# Patient Record
Sex: MALE | Race: ASIAN | Employment: OTHER | URBAN - METROPOLITAN AREA
[De-identification: names, ages, dates, MRNs, and addresses within clinical notes are randomized per-mention and may not be internally consistent; named-entity substitution may affect disease eponyms.]

---

## 2017-09-27 ENCOUNTER — HOSPITAL ENCOUNTER (OUTPATIENT)
Dept: RADIATION ONCOLOGY | Age: 77
Discharge: HOME | End: 2017-09-27
Payer: MEDICARE

## 2017-09-27 VITALS
RESPIRATION RATE: 16 BRPM | SYSTOLIC BLOOD PRESSURE: 164 MMHG | DIASTOLIC BLOOD PRESSURE: 89 MMHG | HEART RATE: 71 BPM | WEIGHT: 133.6 LBS | OXYGEN SATURATION: 95 % | TEMPERATURE: 96.8 F

## 2017-09-27 PROCEDURE — 99214 OFFICE O/P EST MOD 30 MIN: CPT | Performed by: RADIOLOGY

## 2017-09-27 RX ORDER — GLIPIZIDE 5 MG/1
5 TABLET ORAL
COMMUNITY

## 2017-09-27 RX ORDER — FENOFIBRATE 54 MG/1
54 TABLET ORAL DAILY
COMMUNITY

## 2017-09-27 RX ORDER — BETAMETHASONE DIPROPIONATE 0.5 MG/G
CREAM TOPICAL 2 TIMES DAILY
COMMUNITY
End: 2018-01-12

## 2017-09-27 RX ORDER — DEXAMETHASONE 4 MG/1
4 TABLET ORAL 4 TIMES DAILY
COMMUNITY
End: 2018-01-12

## 2017-09-27 RX ORDER — CLOBETASOL PROPIONATE 0.5 MG/ML
LOTION TOPICAL CONTINUOUS PRN
COMMUNITY
End: 2018-01-12

## 2017-09-27 RX ORDER — HYDROCHLOROTHIAZIDE 25 MG/1
12.5 TABLET ORAL DAILY
COMMUNITY

## 2017-09-27 RX ORDER — M-VIT,TX,IRON,MINS/CALC/FOLIC 27MG-0.4MG
1 TABLET ORAL DAILY
COMMUNITY
End: 2018-04-13

## 2017-09-27 RX ORDER — FLUCONAZOLE 200 MG/1
200 TABLET ORAL 2 TIMES DAILY
COMMUNITY
End: 2018-01-12

## 2017-09-27 RX ORDER — ISOSORBIDE MONONITRATE 30 MG/1
30 TABLET, EXTENDED RELEASE ORAL DAILY
COMMUNITY

## 2017-09-27 RX ORDER — TERAZOSIN 5 MG/1
10 CAPSULE ORAL NIGHTLY
COMMUNITY
End: 2018-04-13

## 2017-09-27 RX ORDER — METOPROLOL SUCCINATE 50 MG/1
50 TABLET, EXTENDED RELEASE ORAL DAILY
COMMUNITY

## 2017-09-27 RX ORDER — ACYCLOVIR 400 MG/1
400 TABLET ORAL 2 TIMES DAILY
COMMUNITY
End: 2018-01-12

## 2017-09-27 RX ORDER — LOSARTAN POTASSIUM 50 MG/1
25 TABLET ORAL
Status: ON HOLD | COMMUNITY
End: 2018-11-30 | Stop reason: ALTCHOICE

## 2017-09-27 RX ORDER — FUROSEMIDE 20 MG/1
20 TABLET ORAL CONTINUOUS PRN
COMMUNITY
End: 2018-01-12

## 2017-09-27 NOTE — H&P
Jeremiah next Tuesday in f/u. Past Medical History:   Diagnosis Date    CAD (coronary artery disease)     DLBCL (diffuse large B cell lymphoma) (HCC)     stage III from brain biopsy    Hyperlipidemia     Hypertension     Psoriasis     Type 2 diabetes mellitus without complication (HCC)        Past Surgical History:   Procedure Laterality Date    BRAIN BIOPSY      BRAIN SURGERY      CORONARY ARTERY BYPASS GRAFT      EYE SURGERY      cataracts    LYMPH NODE BIOPSY      UPPER GASTROINTESTINAL ENDOSCOPY         Prior to Admission medications    Medication Sig Start Date End Date Taking?  Authorizing Provider   losartan (COZAAR) 50 MG tablet Take 50 mg by mouth daily   Yes Historical Provider, MD   fenofibrate (TRICOR) 54 MG tablet Take 54 mg by mouth daily s [facility] pharmacy: Please dispense generic fenofibrate unless prescriber denote   Yes Historical Provider, MD   terazosin (HYTRIN) 5 MG capsule Take 10 mg by mouth nightly   Yes Historical Provider, MD   hydrochlorothiazide (HYDRODIURIL) 25 MG tablet Take 25 mg by mouth daily   Yes Historical Provider, MD   furosemide (LASIX) 20 MG tablet Take 20 mg by mouth continuous prn   Yes Historical Provider, MD   isosorbide mononitrate (IMDUR) 30 MG extended release tablet Take 30 mg by mouth daily 1 tab in the am half tab PM   Yes Historical Provider, MD   metoprolol succinate (TOPROL XL) 50 MG extended release tablet Take 50 mg by mouth daily One and a half tabs in the AM   Yes Historical Provider, MD   aspirin 81 MG tablet Take 81 mg by mouth daily   Yes Historical Provider, MD   metFORMIN (GLUCOPHAGE) 1000 MG tablet Take 1,000 mg by mouth daily   Yes Historical Provider, MD   glipiZIDE (GLUCOTROL) 5 MG tablet Take 5 mg by mouth 2 times daily (before meals) 5/500 twice daily   Yes Historical Provider, MD   Multiple Vitamins-Minerals (THERAPEUTIC MULTIVITAMIN-MINERALS) tablet Take 1 tablet by mouth daily   Yes Historical Provider, MD   Cholecalciferol (VITAMIN D3) 5000 units TABS Take by mouth   Yes Historical Provider, MD   betamethasone dipropionate (DIPROLENE) 0.05 % cream Apply topically 2 times daily Apply topically 2 times daily. Yes Historical Provider, MD   clobetasol propionate 0.05 % LOTN lotion Apply topically continuous prn   Yes Historical Provider, MD   dexamethasone (DECADRON) 4 MG tablet Take 4 mg by mouth 4 times daily   Yes Historical Provider, MD   acyclovir (ZOVIRAX) 400 MG tablet Take 400 mg by mouth 2 times daily   Yes Historical Provider, MD   fluconazole (DIFLUCAN) 200 MG tablet Take 200 mg by mouth 2 times daily   Yes Historical Provider, MD       No Known Allergies    History   Smoking Status    Former Smoker    Packs/day: 0.50    Years: 20.00    Types: Cigarettes    Quit date: 1990   Smokeless Tobacco    Not on file     History   Alcohol Use No       Family History   Problem Relation Age of Onset    Cancer Maternal Cousin      3 second cousins each with a different cancer, colon,pancrease,and brain       Review Of Systems:   CONSTITUTIONAL: Weakness and fatigue  HEENT:  Dentures  RESPIRATORY:  Negative   CARDIOVASCULAR: No active problems. History of CAD (s/p CABG) and right bundle branch block. GASTROINTESTINAL: Negative  GENITOURINARY: Denies problems, nocturia ×3. However, the patient wears pads, and he leaked urine in the examination room today. MUSCULOSKELETAL: Negative  INTEGUMENT: Psoriasis  HEMATOLOGIC/LYMPHATIC: Prior transfusion  NEUROLOGICAL: Prior to the finding of CNS metastases several months ago, the patient apparently had intact cognition. Now he is somewhat slow, and prior to the biopsy he lost his ability to speak for 1 week, and had memory difficulties. A 10-point review of systems has been conducted and pertinent positives have been   recorded. All other review of systems are negative.      Physical Exam:  Vitals:    09/27/17 1306   BP: (!) 164/89   Pulse: 71   Resp: 16   Temp: 96.8 °F (36 °C)   SpO2: 95%   Weight: 133 lb 9.6 oz (60.6 kg)     ECOG PS: 2  GENERAL: NAD, appears stated age. HEENT: PERRLA, EOMI. Remaining cranial nerves were intact. Oral cavity unremarkable apart from dentures. NECK: No palpable cervical or supraclavicular adenopathy. RESPIRATORY/LUNGS: Good air entry bilaterally, no Rales or wheezing. No crackles. CARDIOVASCULAR/HEART: Regular rate and rhythm. No audible murmur. ABDOMEN/GASTROINTESTINAL: Flat, soft, nontender, nondistended, normal bowel sounds. No organomegaly. EXTREMETIES: No cyanosis, clubbing, or edema. Psoriasis over the lower extremities. NEUROLOGICAL: Slow gait, somewhat unsteady climbing onto the examination table. Right sided pronator drift present, and right  strength is slightly diminished. The patient was unable to sign the consent form due to coordination difficulties, and his wife did this for him. Sensation intact. Assessment/Plan:  77-year-old male with a diagnosis of diffuse large B-cell lymphoma, stage III initially with diffuse adenopathy above and below the diaphragm and a negative bone marrow biopsy. He now has recurrent disease in the brain, and per the most recent scans is free of disease outside the skull. His cognitive function is improving on steroids, and he had a considerable midline shift prior to the biopsy. There could be further change in geometry as the edema resolves, so I delayed the simulation for next week, and will do a repeat MRI for treatment planning at that time. He is not a candidate for methotrexate per Dr. Vanna Call, and I would recommend fractionated radiotherapy. For patients in this elderly age group, there is an increased risk of dementia resulting from radiotherapy, based on published data from [facility] and [facility]. I would recommend standard fractionation of 1.8-2 Gy per fraction, and treat the entire brain to a dose between 30 and 36 Gy, then boost the enhancing lesions to a total of 45-46Gy.   These smaller radiotherapy fractions could potentially reduce the cognitive impact of cranial irradiation. Risks and benefits were discussed with the patient and his wife, and this signed informed consent. CT simulation will take place in 1 week. Thank you for this consult and allowing us to participate in the care of this patient.      Electronically signed by Aggie Ríos MD on 9/27/17 at 2:41 PM

## 2017-10-30 ENCOUNTER — HOSPITAL ENCOUNTER (OUTPATIENT)
Dept: RADIATION ONCOLOGY | Age: 77
Discharge: HOME | End: 2017-10-30
Payer: MEDICARE

## 2017-10-30 DIAGNOSIS — C83.38 DIFFUSE LARGE B-CELL LYMPHOMA OF LYMPH NODES OF MULTIPLE REGIONS (HCC): Primary | ICD-10-CM

## 2017-10-30 PROCEDURE — 77290 THER RAD SIMULAJ FIELD CPLX: CPT | Performed by: RADIOLOGY

## 2017-10-30 PROCEDURE — 77334 RADIATION TREATMENT AID(S): CPT | Performed by: RADIOLOGY

## 2017-10-30 PROCEDURE — 77399 UNLISTED PX MED RADJ PHYSICS: CPT | Performed by: RADIOLOGY

## 2017-10-30 PROCEDURE — 99214 OFFICE O/P EST MOD 30 MIN: CPT | Performed by: RADIOLOGY

## 2017-10-30 NOTE — ONCOLOGY
Former Smoker    Packs/day: 0.50    Years: 20.00    Types: Cigarettes    Quit date: 1990   Smokeless Tobacco    Never Used     History   Alcohol Use No       ALLERGIES:   No Known Allergies     CURRENT MEDICATIONS:     Prior to Admission medications    Medication Sig Start Date End Date Taking? Authorizing Provider   losartan (COZAAR) 50 MG tablet Take 50 mg by mouth daily    Historical Provider, MD   fenofibrate (TRICOR) 54 MG tablet Take 54 mg by mouth daily [facility] pharmacy: Please dispense generic fenofibrate unless prescriber denote    Historical Provider, MD   terazosin (HYTRIN) 5 MG capsule Take 10 mg by mouth nightly    Historical Provider, MD   hydrochlorothiazide (HYDRODIURIL) 25 MG tablet Take 25 mg by mouth daily    Historical Provider, MD   furosemide (LASIX) 20 MG tablet Take 20 mg by mouth continuous prn    Historical Provider, MD   isosorbide mononitrate (IMDUR) 30 MG extended release tablet Take 30 mg by mouth daily 1 tab in the am half tab PM    Historical Provider, MD   metoprolol succinate (TOPROL XL) 50 MG extended release tablet Take 50 mg by mouth daily One and a half tabs in the AM    Historical Provider, MD   aspirin 81 MG tablet Take 81 mg by mouth daily    Historical Provider, MD   metFORMIN (GLUCOPHAGE) 1000 MG tablet Take 1,000 mg by mouth daily    Historical Provider, MD   glipiZIDE (GLUCOTROL) 5 MG tablet Take 5 mg by mouth 2 times daily (before meals) 5/500 twice daily    Historical Provider, MD   Multiple Vitamins-Minerals (THERAPEUTIC MULTIVITAMIN-MINERALS) tablet Take 1 tablet by mouth daily    Historical Provider, MD   Cholecalciferol (VITAMIN D3) 5000 units TABS Take by mouth    Historical Provider, MD   betamethasone dipropionate (DIPROLENE) 0.05 % cream Apply topically 2 times daily Apply topically 2 times daily.     Historical Provider, MD   clobetasol propionate 0.05 % LOTN lotion Apply topically continuous prn    Historical Provider, MD   dexamethasone (DECADRON) 4 MG

## 2017-10-31 PROCEDURE — 77300 RADIATION THERAPY DOSE PLAN: CPT | Performed by: RADIOLOGY

## 2017-10-31 PROCEDURE — 77334 RADIATION TREATMENT AID(S): CPT | Performed by: RADIOLOGY

## 2017-10-31 PROCEDURE — 77412 RADIATION TX DELIVERY LVL 3: CPT | Performed by: RADIOLOGY

## 2017-10-31 PROCEDURE — 77295 3-D RADIOTHERAPY PLAN: CPT | Performed by: RADIOLOGY

## 2017-10-31 PROCEDURE — 77280 THER RAD SIMULAJ FIELD SMPL: CPT | Performed by: RADIOLOGY

## 2017-10-31 PROCEDURE — 99212 OFFICE O/P EST SF 10 MIN: CPT | Performed by: RADIOLOGY

## 2017-11-01 PROCEDURE — 77301 RADIOTHERAPY DOSE PLAN IMRT: CPT | Performed by: RADIOLOGY

## 2017-11-01 PROCEDURE — 77412 RADIATION TX DELIVERY LVL 3: CPT | Performed by: RADIOLOGY

## 2017-11-01 PROCEDURE — 77338 DESIGN MLC DEVICE FOR IMRT: CPT | Performed by: RADIOLOGY

## 2017-11-01 PROCEDURE — 77300 RADIATION THERAPY DOSE PLAN: CPT | Performed by: RADIOLOGY

## 2017-11-02 PROCEDURE — 77370 RADIATION PHYSICS CONSULT: CPT | Performed by: RADIOLOGY

## 2017-11-08 ENCOUNTER — HOSPITAL ENCOUNTER (OUTPATIENT)
Dept: RADIATION ONCOLOGY | Age: 77
Discharge: HOME | End: 2017-11-08
Payer: MEDICARE

## 2017-11-08 PROCEDURE — 77280 THER RAD SIMULAJ FIELD SMPL: CPT | Performed by: RADIOLOGY

## 2017-11-08 PROCEDURE — 77412 RADIATION TX DELIVERY LVL 3: CPT | Performed by: RADIOLOGY

## 2017-11-08 PROCEDURE — 99212 OFFICE O/P EST SF 10 MIN: CPT | Performed by: RADIOLOGY

## 2017-11-08 PROCEDURE — 77386 HC NTSTY MODUL RAD TX DLVR CPLX: CPT | Performed by: RADIOLOGY

## 2017-11-09 PROCEDURE — 77386 HC NTSTY MODUL RAD TX DLVR CPLX: CPT | Performed by: RADIOLOGY

## 2017-11-10 PROCEDURE — 77386 HC NTSTY MODUL RAD TX DLVR CPLX: CPT | Performed by: RADIOLOGY

## 2017-11-10 PROCEDURE — 77336 RADIATION PHYSICS CONSULT: CPT | Performed by: RADIOLOGY

## 2017-11-13 ENCOUNTER — HOSPITAL ENCOUNTER (OUTPATIENT)
Dept: RADIATION ONCOLOGY | Age: 77
Discharge: HOME | End: 2017-11-13
Payer: MEDICARE

## 2017-11-13 PROCEDURE — 77386 HC NTSTY MODUL RAD TX DLVR CPLX: CPT | Performed by: RADIOLOGY

## 2017-11-14 PROCEDURE — 99213 OFFICE O/P EST LOW 20 MIN: CPT | Performed by: RADIOLOGY

## 2017-11-14 PROCEDURE — 77386 HC NTSTY MODUL RAD TX DLVR CPLX: CPT | Performed by: RADIOLOGY

## 2017-11-17 PROCEDURE — 77386 HC NTSTY MODUL RAD TX DLVR CPLX: CPT | Performed by: RADIOLOGY

## 2017-11-17 PROCEDURE — 77336 RADIATION PHYSICS CONSULT: CPT | Performed by: RADIOLOGY

## 2017-11-20 NOTE — ONCOLOGY
November 19, 2017        Heather Fletcher MD  [address] [facility]    Radiation Oncology Completion Letter    Patient Name:  Tima Bravo  Medical Record #: [id number]   Date of Birth: [birth year]  Diagnosis:  Stage III NHL (diffuse large B-cell) dx 5/16, s/p mini-R-CHOP (Dr. Swati Strong). Now  with  CNS  progression  (L  frontal  with satellite, infundibulum, L cerebellum). Treatment Dates: 10/31/17 - 11/17/17    Site: Dose: Total # fractions: Dose per fraction: [employer]: Prescription Depth: Field Technique   Prior Whole Brain at  30.4 Gy 17 1.8 Gy 6 MV photons  Laterals      Whole Brain 5.4 Gy 3 1.8 Gy 6 MV photons 99% Isodose line Laterals   Boost 9 Gy 5 1.8 Gy 6 MV photons 98% Isodose line IMRT/IGRT (VMAT)   Total 45 Gy 25 1.8 Gy        Concurrent therapy:  none. Steroid taper. Technique:   Treatment was started at [facility], due to postop hemorrhage after the biopsy, and PE. A partial course of whole brain RT was delivered before the patient was discharged home. He continued to commute downtown to [facility] for a week before he became aware that he could be treated at [facility]. The whole brain was continued for 3 more fractions, bringing the total to 36Gy at 1.8Gy per fraction. Field within [place] was used to improve dose homogeneity. Fields were defined with an [address]. The boost was delivered with intensity modulation (IMRT), used to optimize the dose distribution and spare normal tissue toxicity, sparing dose to the optics, brainstem, and uninvolved brain. This was delivered with a set of 2 counter rotating VMAT arcs using 6MV photons modulated with an [address]. Daily cone beam CT image guidance (IGRT) was used to allow reduced planning margins reducing potential side effects, aligning to the skull. Treatment course:  Mr. Nubia Rivas tolerated radiation treatment well tolerable symptoms. He had some problems with diabetic control while on decadron, and this was tapered.  Near completion he had headaches without focal neuro deficit, but by completion this had resolved. Plan: f/u 1m with MRI brain. The patient is also following with medical oncology (Dr. Dorina Ovalle). Instructions to taper off of the steroids were given. Thank you again for allowing us to participate in the care of this patient.      Sincerely,       Everton Gutierrez MD, PhD  Board Certified Radiation Oncologist  [facility] affiliated with  [facility]

## 2018-01-12 ENCOUNTER — HOSPITAL ENCOUNTER (OUTPATIENT)
Dept: RADIATION ONCOLOGY | Age: 78
Discharge: HOME | End: 2018-01-12
Payer: MEDICARE

## 2018-01-12 VITALS
RESPIRATION RATE: 14 BRPM | DIASTOLIC BLOOD PRESSURE: 76 MMHG | SYSTOLIC BLOOD PRESSURE: 127 MMHG | OXYGEN SATURATION: 95 % | HEART RATE: 80 BPM | TEMPERATURE: 98.1 F

## 2018-01-12 PROCEDURE — 99214 OFFICE O/P EST MOD 30 MIN: CPT | Performed by: RADIOLOGY

## 2018-01-12 RX ORDER — DABIGATRAN ETEXILATE 75 MG/1
75 CAPSULE, COATED PELLETS ORAL
Status: ON HOLD | COMMUNITY
End: 2018-11-30 | Stop reason: ALTCHOICE

## 2018-01-12 NOTE — ONCOLOGY
PHYSICAL EXAMINATION:  GENERAL: Elderly male in a wheelchair, much more alert and communicative the 1 previously seen. Seen with his wife. The patient was able to ambulate and with assistance climb onto the examination table. HEENT:  PERRLA, EOMI. remaining cranial nerves were intact. Oral cavity WNL. NECK:  No palpable cervical or supraclavicular adenopathy. CHEST/LUNGS: CTA, no rib or spine tenderness. CARDIOVASCULAR:  RRR, no audible murmur  ABDOMEN:  Nontender, nondistended, normal bowel sounds  EXTREMITIES: No C/C/E. there were some rough lesions over the lower extremities with increased pigmentation. These reportedly developed when he was suffering from a pulmonary embolism in the past.  NEUROLOGIC:  Romberg negative, slow unsteady gait secondary to generalized weakness. No pronator drift with normal proprioception and coordination. STUDIES: MRI of the brain, PET/CT, imaging reviewed    IMPRESSION/PLAN:    Overall I think the scans are favorable. It is impossible to rule out persistent disease in the left frontal lobe, at the site of enhancement on MRI. I would recommend close follow-up rather than intervention with radiotherapy or further surgery. The patient appears to be enjoying a good quality of life at present. I will see him back in 3 months with an MRI of the brain. If there is some progression would consider treatment at that time. I advised the patient's wife that if he were to suffer any neurological symptoms or severe headaches prior to that point, we would move the scan up. Electronically signed by Hood Santamaria MD on 1/12/18 at 12:00 PM      Thank you for allowing us to participate in the care of this patient.   cc:   No att. providers found  Dimitri CHAVEZ [address], [place]   [address], [address] [facility]

## 2018-04-13 ENCOUNTER — HOSPITAL ENCOUNTER (OUTPATIENT)
Dept: RADIATION ONCOLOGY | Age: 78
Discharge: HOME | End: 2018-04-13
Payer: MEDICARE

## 2018-04-13 VITALS
OXYGEN SATURATION: 99 % | DIASTOLIC BLOOD PRESSURE: 72 MMHG | SYSTOLIC BLOOD PRESSURE: 113 MMHG | HEART RATE: 70 BPM | WEIGHT: 144 LBS | TEMPERATURE: 97.3 F | RESPIRATION RATE: 16 BRPM

## 2018-04-13 DIAGNOSIS — C83.38 DIFFUSE LARGE B-CELL LYMPHOMA OF LYMPH NODES OF MULTIPLE REGIONS (HCC): Primary | ICD-10-CM

## 2018-04-13 PROCEDURE — 99212 OFFICE O/P EST SF 10 MIN: CPT | Performed by: RADIOLOGY

## 2018-04-13 RX ORDER — GABAPENTIN 300 MG/1
300 CAPSULE ORAL EVERY EVENING
COMMUNITY

## 2018-04-13 RX ORDER — METOPROLOL TARTRATE 50 MG/1
50 TABLET, FILM COATED ORAL EVERY EVENING
Status: ON HOLD | COMMUNITY
End: 2018-11-30

## 2018-11-30 ENCOUNTER — APPOINTMENT (OUTPATIENT)
Dept: ULTRASOUND IMAGING | Age: 78
DRG: 682 | End: 2018-11-30
Payer: MEDICARE

## 2018-11-30 ENCOUNTER — APPOINTMENT (OUTPATIENT)
Dept: GENERAL RADIOLOGY | Age: 78
DRG: 682 | End: 2018-11-30
Payer: MEDICARE

## 2018-11-30 ENCOUNTER — HOSPITAL ENCOUNTER (INPATIENT)
Age: 78
LOS: 6 days | Discharge: NURSING, REHAB OR LONG-TERM CARE | DRG: 682 | End: 2018-12-06
Attending: EMERGENCY MEDICINE | Admitting: INTERNAL MEDICINE
Payer: MEDICARE

## 2018-11-30 ENCOUNTER — APPOINTMENT (OUTPATIENT)
Dept: CT IMAGING | Age: 78
DRG: 682 | End: 2018-11-30
Payer: MEDICARE

## 2018-11-30 DIAGNOSIS — R91.8 LUNG INFILTRATE ON CT: ICD-10-CM

## 2018-11-30 DIAGNOSIS — E86.0 DEHYDRATION: ICD-10-CM

## 2018-11-30 DIAGNOSIS — Z85.79 H/O LYMPHOMA: ICD-10-CM

## 2018-11-30 DIAGNOSIS — R53.1 GENERAL WEAKNESS: Primary | ICD-10-CM

## 2018-11-30 DIAGNOSIS — Z66 DNR (DO NOT RESUSCITATE): ICD-10-CM

## 2018-11-30 PROBLEM — J18.9 PNEUMONIA: Status: ACTIVE | Noted: 2018-11-30

## 2018-11-30 LAB
ALBUMIN SERPL-MCNC: 2.9 G/DL (ref 3.9–4.9)
ALP BLD-CCNC: 334 U/L (ref 35–104)
ALT SERPL-CCNC: 70 U/L (ref 0–41)
ANION GAP SERPL CALCULATED.3IONS-SCNC: 11 MEQ/L (ref 7–13)
AST SERPL-CCNC: 104 U/L (ref 0–40)
BILIRUB SERPL-MCNC: 1.5 MG/DL (ref 0–1.2)
BILIRUBIN URINE: NEGATIVE
BLOOD, URINE: NEGATIVE
BUN BLDV-MCNC: 25 MG/DL (ref 8–23)
CALCIUM SERPL-MCNC: 10.8 MG/DL (ref 8.6–10.2)
CHLORIDE BLD-SCNC: 96 MEQ/L (ref 98–107)
CLARITY: CLEAR
CO2: 27 MEQ/L (ref 22–29)
COLOR: YELLOW
CREAT SERPL-MCNC: 1.31 MG/DL (ref 0.7–1.2)
D DIMER: 2.19 MG/L FEU (ref 0–0.5)
EKG ATRIAL RATE: 74 BPM
EKG ATRIAL RATE: 76 BPM
EKG P AXIS: 48 DEGREES
EKG P AXIS: 9 DEGREES
EKG P-R INTERVAL: 186 MS
EKG P-R INTERVAL: 196 MS
EKG Q-T INTERVAL: 396 MS
EKG Q-T INTERVAL: 402 MS
EKG QRS DURATION: 132 MS
EKG QRS DURATION: 136 MS
EKG QTC CALCULATION (BAZETT): 445 MS
EKG QTC CALCULATION (BAZETT): 446 MS
EKG R AXIS: -53 DEGREES
EKG R AXIS: -58 DEGREES
EKG T AXIS: 12 DEGREES
EKG T AXIS: 32 DEGREES
EKG VENTRICULAR RATE: 74 BPM
EKG VENTRICULAR RATE: 76 BPM
GFR AFRICAN AMERICAN: >60
GFR NON-AFRICAN AMERICAN: 52.6
GLOBULIN: 3.7 G/DL (ref 2.3–3.5)
GLUCOSE BLD-MCNC: 103 MG/DL (ref 74–109)
GLUCOSE URINE: NEGATIVE MG/DL
HCT VFR BLD CALC: 28.2 % (ref 42–52)
HEMOGLOBIN: 9.7 G/DL (ref 14–18)
INR BLD: 2.5
KETONES, URINE: NEGATIVE MG/DL
LACTIC ACID: 2 MMOL/L (ref 0.5–2.2)
LEUKOCYTE ESTERASE, URINE: NEGATIVE
MCH RBC QN AUTO: 32.3 PG (ref 27–31.3)
MCHC RBC AUTO-ENTMCNC: 34.5 % (ref 33–37)
MCV RBC AUTO: 93.7 FL (ref 80–100)
NITRITE, URINE: NEGATIVE
PDW BLD-RTO: 19.5 % (ref 11.5–14.5)
PH UA: 6 (ref 5–9)
PLATELET # BLD: 163 K/UL (ref 130–400)
POTASSIUM SERPL-SCNC: 4.3 MEQ/L (ref 3.5–5.1)
PROTEIN UA: NEGATIVE MG/DL
PROTHROMBIN TIME: 25.4 SEC (ref 9.6–12.3)
RBC # BLD: 3 M/UL (ref 4.7–6.1)
SODIUM BLD-SCNC: 134 MEQ/L (ref 132–144)
SPECIFIC GRAVITY UA: 1.02 (ref 1–1.03)
TOTAL PROTEIN: 6.6 G/DL (ref 6.4–8.1)
TROPONIN: 0.02 NG/ML (ref 0–0.01)
TROPONIN: 0.02 NG/ML (ref 0–0.01)
TROPONIN: 0.03 NG/ML (ref 0–0.01)
URINE REFLEX TO CULTURE: NORMAL
UROBILINOGEN, URINE: 1 E.U./DL
WBC # BLD: 2.4 K/UL (ref 4.8–10.8)

## 2018-11-30 PROCEDURE — 6370000000 HC RX 637 (ALT 250 FOR IP): Performed by: INTERNAL MEDICINE

## 2018-11-30 PROCEDURE — 93005 ELECTROCARDIOGRAM TRACING: CPT

## 2018-11-30 PROCEDURE — 87040 BLOOD CULTURE FOR BACTERIA: CPT

## 2018-11-30 PROCEDURE — 81003 URINALYSIS AUTO W/O SCOPE: CPT

## 2018-11-30 PROCEDURE — 85379 FIBRIN DEGRADATION QUANT: CPT

## 2018-11-30 PROCEDURE — 85610 PROTHROMBIN TIME: CPT

## 2018-11-30 PROCEDURE — 6360000004 HC RX CONTRAST MEDICATION: Performed by: EMERGENCY MEDICINE

## 2018-11-30 PROCEDURE — 76705 ECHO EXAM OF ABDOMEN: CPT

## 2018-11-30 PROCEDURE — 84484 ASSAY OF TROPONIN QUANT: CPT

## 2018-11-30 PROCEDURE — 36415 COLL VENOUS BLD VENIPUNCTURE: CPT

## 2018-11-30 PROCEDURE — 1210000000 HC MED SURG R&B

## 2018-11-30 PROCEDURE — 83605 ASSAY OF LACTIC ACID: CPT

## 2018-11-30 PROCEDURE — 71260 CT THORAX DX C+: CPT

## 2018-11-30 PROCEDURE — 6360000002 HC RX W HCPCS: Performed by: EMERGENCY MEDICINE

## 2018-11-30 PROCEDURE — 85027 COMPLETE CBC AUTOMATED: CPT

## 2018-11-30 PROCEDURE — 71045 X-RAY EXAM CHEST 1 VIEW: CPT

## 2018-11-30 PROCEDURE — 93970 EXTREMITY STUDY: CPT

## 2018-11-30 PROCEDURE — 99285 EMERGENCY DEPT VISIT HI MDM: CPT

## 2018-11-30 PROCEDURE — 2580000003 HC RX 258: Performed by: INTERNAL MEDICINE

## 2018-11-30 PROCEDURE — 2580000003 HC RX 258: Performed by: EMERGENCY MEDICINE

## 2018-11-30 PROCEDURE — 80053 COMPREHEN METABOLIC PANEL: CPT

## 2018-11-30 RX ORDER — SODIUM CHLORIDE 9 MG/ML
INJECTION, SOLUTION INTRAVENOUS CONTINUOUS
Status: DISPENSED | OUTPATIENT
Start: 2018-11-30 | End: 2018-12-01

## 2018-11-30 RX ORDER — WARFARIN SODIUM 1 MG/1
1 TABLET ORAL DAILY
COMMUNITY

## 2018-11-30 RX ORDER — ISOSORBIDE MONONITRATE 30 MG/1
30 TABLET, EXTENDED RELEASE ORAL DAILY
Status: DISCONTINUED | OUTPATIENT
Start: 2018-11-30 | End: 2018-12-06 | Stop reason: HOSPADM

## 2018-11-30 RX ORDER — METOPROLOL SUCCINATE 25 MG/1
25 TABLET, EXTENDED RELEASE ORAL DAILY
Status: DISCONTINUED | OUTPATIENT
Start: 2018-11-30 | End: 2018-12-06 | Stop reason: HOSPADM

## 2018-11-30 RX ORDER — ASPIRIN 81 MG/1
81 TABLET, CHEWABLE ORAL DAILY
Status: DISCONTINUED | OUTPATIENT
Start: 2018-11-30 | End: 2018-12-06 | Stop reason: HOSPADM

## 2018-11-30 RX ORDER — 0.9 % SODIUM CHLORIDE 0.9 %
500 INTRAVENOUS SOLUTION INTRAVENOUS ONCE
Status: COMPLETED | OUTPATIENT
Start: 2018-11-30 | End: 2018-11-30

## 2018-11-30 RX ORDER — WARFARIN SODIUM 1 MG/1
1.5 TABLET ORAL DAILY
COMMUNITY

## 2018-11-30 RX ORDER — SODIUM CHLORIDE 9 MG/ML
INJECTION, SOLUTION INTRAVENOUS CONTINUOUS
Status: DISCONTINUED | OUTPATIENT
Start: 2018-11-30 | End: 2018-11-30

## 2018-11-30 RX ORDER — METOPROLOL SUCCINATE 50 MG/1
50 TABLET, EXTENDED RELEASE ORAL DAILY
COMMUNITY

## 2018-11-30 RX ORDER — LENALIDOMIDE 10 MG/1
10 CAPSULE ORAL DAILY
COMMUNITY

## 2018-11-30 RX ADMIN — SODIUM CHLORIDE 500 ML: 9 INJECTION, SOLUTION INTRAVENOUS at 08:24

## 2018-11-30 RX ADMIN — SODIUM CHLORIDE: 9 INJECTION, SOLUTION INTRAVENOUS at 14:33

## 2018-11-30 RX ADMIN — WARFARIN SODIUM 2.5 MG: 2 TABLET ORAL at 18:23

## 2018-11-30 RX ADMIN — CEFTRIAXONE 1 G: 1 INJECTION, POWDER, FOR SOLUTION INTRAMUSCULAR; INTRAVENOUS at 08:25

## 2018-11-30 RX ADMIN — SODIUM CHLORIDE 500 ML: 9 INJECTION, SOLUTION INTRAVENOUS at 12:39

## 2018-11-30 RX ADMIN — IOPAMIDOL 100 ML: 755 INJECTION, SOLUTION INTRAVENOUS at 06:45

## 2018-11-30 RX ADMIN — AZITHROMYCIN MONOHYDRATE 500 MG: 500 INJECTION, POWDER, LYOPHILIZED, FOR SOLUTION INTRAVENOUS at 08:37

## 2018-11-30 RX ADMIN — ISOSORBIDE MONONITRATE 30 MG: 30 TABLET, EXTENDED RELEASE ORAL at 12:42

## 2018-11-30 RX ADMIN — ASPIRIN 81 MG 81 MG: 81 TABLET ORAL at 12:42

## 2018-11-30 ASSESSMENT — ENCOUNTER SYMPTOMS
BACK PAIN: 0
STRIDOR: 0
CHEST TIGHTNESS: 0
VOMITING: 0
CHOKING: 0
CONSTIPATION: 0
SINUS PRESSURE: 0
BLOOD IN STOOL: 0
EYE PAIN: 0
SHORTNESS OF BREATH: 0
TROUBLE SWALLOWING: 0
VOICE CHANGE: 0
DIARRHEA: 0
EYE REDNESS: 0
WHEEZING: 0
COUGH: 1
EYE DISCHARGE: 0
FACIAL SWELLING: 0
ABDOMINAL PAIN: 0
SORE THROAT: 0

## 2018-11-30 ASSESSMENT — PAIN DESCRIPTION - PROGRESSION: CLINICAL_PROGRESSION: GRADUALLY WORSENING

## 2018-11-30 ASSESSMENT — PAIN SCALES - GENERAL
PAINLEVEL_OUTOF10: 0
PAINLEVEL_OUTOF10: 8
PAINLEVEL_OUTOF10: 0

## 2018-11-30 ASSESSMENT — PAIN DESCRIPTION - LOCATION: LOCATION: GENERALIZED

## 2018-11-30 ASSESSMENT — PAIN DESCRIPTION - PAIN TYPE: TYPE: CHRONIC PAIN

## 2018-11-30 ASSESSMENT — PAIN DESCRIPTION - ONSET: ONSET: ON-GOING

## 2018-11-30 ASSESSMENT — PAIN DESCRIPTION - FREQUENCY: FREQUENCY: INTERMITTENT

## 2018-11-30 ASSESSMENT — PAIN DESCRIPTION - DESCRIPTORS: DESCRIPTORS: ACHING

## 2018-11-30 NOTE — ED PROVIDER NOTES
CHOLECALCIFEROL (VITAMIN D3) 5000 UNITS TABS    Take by mouth    DABIGATRAN (PRADAXA) 75 MG CAPSULE    Take 75 mg by mouth    FENOFIBRATE (TRICOR) 54 MG TABLET    Take 54 mg by mouth daily s [facility]: Please dispense generic fenofibrate unless prescriber denote    GABAPENTIN (NEURONTIN) 300 MG CAPSULE    Take 300 mg by mouth every evening. Lau[facility] GLIPIZIDE (GLUCOTROL) 5 MG TABLET    Take 5 mg by mouth 2 times daily (before meals) 5/500 twice daily    HYDROCHLOROTHIAZIDE (HYDRODIURIL) 25 MG TABLET    Take 12.5 mg by mouth daily     ISOSORBIDE MONONITRATE (IMDUR) 30 MG EXTENDED RELEASE TABLET    Take 30 mg by mouth daily 1 tab in the am half tab PM    LOSARTAN (COZAAR) 50 MG TABLET    Take 25 mg by mouth     METOPROLOL SUCCINATE (TOPROL XL) 50 MG EXTENDED RELEASE TABLET    Take 75 mg by mouth daily In the am    METOPROLOL TARTRATE (LOPRESSOR) 50 MG TABLET    Take 50 mg by mouth every evening       ALLERGIES     Patient has no known allergies.     FAMILY HISTORY       Family History   Problem Relation Age of Onset    Cancer Maternal Cousin         3 second cousins each with a different cancer, colon,pancrease,and brain          SOCIAL HISTORY       Social History     Social History    Marital status:      Spouse name: N/A    Number of children: N/A    Years of education: N/A     Social History Main Topics    Smoking status: Former Smoker     Packs/day: 0.50     Years: 20.00     Types: Cigarettes     Quit date: 1990    Smokeless tobacco: Never Used    Alcohol use No    Drug use: Unknown    Sexual activity: Not Asked     Other Topics Concern    None     Social History Narrative    None       SCREENINGS      @FLOW([id number])@      PHYSICAL EXAM    (up to 7 for level 4, 8 or more for level 5)     ED Triage Vitals [11/30/18 0512]   BP Temp Temp Source Pulse Resp SpO2 Height Weight   (!) 109/57 98.2 °F (36.8 °C) Oral 75 18 96 % -- --       Physical Exam   Constitutional: He is oriented to person, place, and time. He appears well-nourished. Active alert cooperative patient in very much oriented ×3 answering all my questions appropriately eating his breakfast this time   HENT:   Head: Normocephalic and atraumatic. Left Ear: External ear normal.   Nose: Nose normal.   Mouth/Throat: Oropharyngeal exudate present. Eyes: Pupils are equal, round, and reactive to light. EOM are normal. Right eye exhibits no discharge. Left eye exhibits no discharge. Neck: Normal range of motion. No JVD present. No tracheal deviation present. No thyromegaly present. Cardiovascular: Normal rate, regular rhythm and intact distal pulses. Murmur heard. Soft systolic murmur at the apex otherwise essentially normal examination no CHF no hepatosplenomegaly   Pulmonary/Chest: Effort normal. No respiratory distress. He has no wheezes. Abdominal: Soft. He exhibits no distension and no mass. There is no tenderness. There is no guarding. Impression local abdomen patient has no palpable thrill no distention of the abdomen no abdominal tenderness or guarding on my examination   Lymphadenopathy:     He has no cervical adenopathy. Neurological: He is alert and oriented to person, place, and time. No cranial nerve deficit. Coordination normal.   Attention to the cranial examination patient has a good bilateral hand  has no facial passing cranial nerves II through XII grossly intact   Skin: Skin is warm. Nursing note and vitals reviewed.       DIAGNOSTIC RESULTS     EKG: All EKG's are interpreted by the Emergency Department Physician who either signs or Co-signsthis chart in the absence of a cardiologist.      RADIOLOGY:   Non-plain filmimages such as CT, Ultrasound and MRI are read by the radiologist. Plain radiographic images are visualized and preliminarily interpreted by the emergency physician with the below findings:    erpretation per the Radiologist below, if available at the time ofthis note:    XR CHEST PORTABLE   Final MD  11/30/18 5166

## 2018-11-30 NOTE — H&P
screening, patients with immunosuppression, or patients with known primary cancer. * Dimensions are average of long and short axes, rounded to the nearest millimeter. ** Consider all relevant risk factors (see Risk Factors). ____________________________________________________________     Yandy Araujo Chest Portable    Result Date: 11/30/2018  EXAMINATION: XR CHEST PORTABLE CLINICAL HISTORY: Generalized weakness, history of lymphoma COMPARISONS: None available. FINDINGS: Patient is slightly rotated to the right. Cardiac size is borderline. Pulmonary vascularity is normal. There are scattered calcifications from old granulomatous disease. There is left basilar retrocardiac opacity consistent with infiltrate or atelectasis. There is blunting of the left costophrenic angle suggesting small effusion. There is no evidence of adenopathy. There are atherosclerotic changes of the thoracic aorta. There are sternotomy wires and a mediastinal clip. There is spondylosis. LEFT BASILAR ATELECTASIS OR SMALL INFILTRATE. SMALL LEFT PLEURAL EFFUSION. Assessment and Plan         Patient Active Problem List   Diagnosis Code    [facility] C85.10    Coronary atherosclerosis I25.10    Chronic coronary artery disease I25.10    Diabetes mellitus type 2, uncontrolled, without complications ([facility] L54.58    Hypercholesterolemia E78.00    Hypertension I10    Pneumonia J18.9     * Pneumonia. Consolidations seen on the CT of the chest although patient does not exhibit any significant respiratory symptoms. *Generalized weakness, nonfocal, probable progressive decline of his functional status secondary to his neoplastic disease in the setting of acute pneumonia, dehydration and renal failure. *Dehydration and volume loss. *Acute renal failure, secondary to above. Urine analysis is a bland. *Reported lymphoma for which the patient is on CHEMOTHERAPY.     *Elevated troponin, no chest pain, probable type II non-ST

## 2018-11-30 NOTE — PROGRESS NOTES
Nutrition Supplement (ONS) Orders: None  · Anthropometric Measures:  · Ht: 5' 6\" (167.6 cm)   · Current Body Wt: 128 lb 15.5 oz (58.5 kg)  · Admission Body Wt: 128 lb 15.5 oz (58.5 kg)  · Usual Body Wt: 136 lb (61.7 kg) ([facility] chart 9/18.  144#, 4/18 chart)  · % Weight Change:  ,  5%  · Ideal Body Wt: 142 lb (64.4 kg), % Ideal Body 91%  · BMI Classification: BMI 18.5 - 24.9 Normal Weight    Nutrition Interventions:   Continue current diet, Start ONS  Continued Inpatient Monitoring    Nutrition Evaluation:   · Evaluation: Goals set   · Goals: po intake > 75% meals & ONS. Slow weight gain to UBW range.     · Monitoring: Meal Intake, Supplement Intake, Diet Tolerance, Constipation, Weight, Pertinent Labs      Electronically signed by Mara Kim RD, LD on 11/30/18 at 1:43 PM

## 2018-11-30 NOTE — ED NOTES
Pt incontinent upon arrival to ED, hygiene provided, linens changed and clean gown provided. [place] catheter applied to penis and secured to leg with leg securement device. Pt tolerated well, warm blankets applied and pt positioned for comfort.       Shanell AmayaGeising[facility]own Hospital  11/30/18 3412

## 2018-11-30 NOTE — ED NOTES
Patient returned from radiology via cart. Assumed care of patient after receiving patient report.       Suzy Varela RN  11/30/18 2345

## 2018-11-30 NOTE — ED PROVIDER NOTES
name: N/A    Number of children: N/A    Years of education: N/A     Social History Main Topics    Smoking status: Former Smoker     Packs/day: 0.50     Years: 20.00     Types: Cigarettes     Quit date: 1990    Smokeless tobacco: Never Used    Alcohol use No    Drug use: Unknown    Sexual activity: Not Asked     Other Topics Concern    None     Social History Narrative    None       SCREENINGS    Chris Coma Scale  Eye Opening: Spontaneous  Best Verbal Response: Oriented  Best Motor Response: Obeys commands  [place] Coma Scale Score: 15        PHYSICAL EXAM    (up to 7 for level 4, 8 or more for level 5)     ED Triage Vitals [11/30/18 0512]   BP Temp Temp Source Pulse Resp SpO2 Height Weight   (!) 109/57 98.2 °F (36.8 °C) Oral 75 18 96 % -- --       General Appearance:  Alert, Cachectic    Head:  Normocephalic, without obvious abnormality, atraumatic. Eyes:  conjunctiva/corneas clear, EOM's intact. Sclera anicteric. ENT: Mucous membranes moist.   Neck: Supple, symmetrical, trachea midline, no adenopathy. No jugular venous distention. Lungs:   No Respiratory Distress. Chest Wall:  Nontender, healed old sternotomy scar    Heart:  +S1/S2, No murmurs or gallops . Abdomen:   Nontender/Nondistended No hepatosplenomegaly   Extremities:  Full range of motion. Tenderness palpation bilaterally. Pulses: Peripheral pulses 2+ and equal in all extremities. Skin:  No rashes or lesions to exposed skin. Neurologic: Alert and oriented X 3. Motor grossly normal.  Speech clear.         DIAGNOSTIC RESULTS     EKG: All EKG's are interpreted by the Emergency Department Physician who either signs or Co-signsthis chart in the absence of a cardiologist.    EKG Interpretation    Interpreted by emergency department physician    Rhythm: normal sinus   Rate: normal  Axis: normal  Ectopy: none  Conduction: bifasicular  ST Segments: normal  T Waves: non specific changes      Clinical Impression: non-specific EKG    [facility]      RADIOLOGY:   Non-plain filmimages such as CT, Ultrasound and MRI are read by the radiologist. Plain radiographic images are visualized and preliminarily interpreted by the emergency physician with the below findings:      Interpretation per the Radiologist below, if available at the time of this note:    RADIOLOGY REPORT   Final Result      XR CHEST PORTABLE   Final Result   RESOLVING INFILTRATE/ATELECTASIS LEFT BASE. NO DEVELOPING PROCESS IN THE LUNG YODER. NM HEPATOBILIARY SCAN W EJECTION FRACTION   Final Result   NORMAL RADIONUCLIDE HEPATOBILIARY SCAN (HIDA) WITH A NORMAL GALLBLADDER EJECTION FRACTION. CT HEAD WO CONTRAST   Final Result      NO ACUTE INTRA-AXIAL OR EXTRA-AXIAL FINDINGS. IF SIGNS OR SYMPTOMS PERSIST THEN CONSIDER REPEAT CT SCAN IN 12 TO 24 HOURS OR MRI TO FURTHER EVALUATE IF THERE ARE NO CONTRAINDICATIONS            All CT scans at this facility use dose modulation, iterative reconstruction, and/or weight based dosing when appropriate to reduce radiation dose to as low as reasonably achievable. US ABDOMEN LIMITED   Final Result      THERE IS TRACE PERICHOLECYSTIC FLUID. THE GALLBLADDER WALL IS THICKENED. THICKENING OF THE GALLBLADDER WALL IS A NONSPECIFIC FINDING CAN BE SEEN IN MULTIPLE ETIOLOGIES. CORRELATE CLINICALLY. CORRELATE CLINICALLY. 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 NO EVIDENCE OF ACUTE DEEP VENOUS THROMBOSIS. CT CHEST W CONTRAST   Final Result   VERY LIMITED EXAMINATION FOR EVALUATION OF PULMONARY EMBOLISM. NO DEFINITE CENTRAL OR PROXIMAL PULMONARY EMBOLISM IDENTIFIED.  IF THERE REMAINS AN INTERMEDIATE OR HIGH CLINICAL SUSPICION OF PULMONARY EMBOLISM, REPEAT CHEST CTA OR RADIONUCLIDE    LUNG SCAN IS ADVISED. BIBASILAR SUBPLEURAL SUBSEGMENTAL CONSOLIDATIONS, LEFT GREATER THAN RIGHT, CONSISTENT WITH PNEUMONIA. SMALL LEFT PARAPNEUMONIC EFFUSION. RIGHT LUNG APICAL 4 MM NODULE AND RIGHT LUNG MIDDLE LOBE 8 MM NODULE--THIS PATIENT LIKELY HAS    PRIOR CHEST IMAGING STUDIES PERFORMED AT OTHER INSTITUTIONS--RETRIEVAL OF PRIOR STUDIES OR REPORTS FOR COMPARISON WOULD BE HELPFUL. [facility] [facility] GUIDELINES ARE PROVIDED BELOW FOR REFERENCE, BUT ARE NOT APPLICABLE FOR PATIENTS WITH KNOWN PRIMARY    CANCER. ABDOMINAL ADENOPATHY, CONSISTENT WITH HISTORY OF LYMPHOMA.         ____________________________________________________________      Guidelines for Management of Incidental Pulmonary Nodules Detected on CT Images: From the Fleischner Society 2017    Radiology:2017 Jul;284(1):228-243. doi: 10.1148/radiol. [id number]. Epub 2017 Feb 23. A: Solid Nodules*        Multiple                              Low risk**                   <6 mm     No routine follow-up                  6-8 mm     CT at 3-6 months, then consider CT at 18-24 months                   >8 mm     CT at 3-6 months, then  consider CT at 18-24 months   Use most suspicious nodule as guide to management. Follow-up intervals may vary according to size and risk (recommendation 2A). High risk**                   <6 mm     Optional CT at 12 months                  6-8 mm     CT at 3-6 months, then at 18-24 months                  >8 mm     CT at 3-6 months, then at 18-24 months   Use most suspicious nodule as guide to management. Follow-up intervals may vary according to size and risk (recommendation 2A). Note. --These recommendations do not apply to lung cancer screening, patients with immunosuppression, or patients with known primary cancer. * Dimensions are average of long and short axes, rounded to the nearest millimeter.     ** Consider all relevant risk factors (see Risk TROPONIN - Abnormal; Notable for the following:     Troponin 0.025 (*)     All other components within normal limits    Narrative:     CALL  [employer] tel. [phone],  critical trop results called to and read back by [facility], 11/30/2018 18:05, by  ALEJANDRA   PROTIME-INR - Abnormal; Notable for the following:     Protime 25.4 (*)     All other components within normal limits   COMPREHENSIVE METABOLIC PANEL - Abnormal; Notable for the following:     Glucose 123 (*)     Total Protein 6.0 (*)     Alb 2.4 (*)     Alkaline Phosphatase 377 (*)     ALT 68 (*)     AST 95 (*)     Globulin 3.6 (*)     All other components within normal limits   CBC WITH AUTO DIFFERENTIAL - Abnormal; Notable for the following:     WBC 2.0 (*)     RBC 2.86 (*)     Hemoglobin 9.2 (*)     Hematocrit 26.7 (*)     MCH 32.3 (*)     RDW 19.3 (*)     Neutrophils # 1.1 (*)     Lymphocytes # 0.6 (*)     All other components within normal limits   PROTIME-INR - Abnormal; Notable for the following:     Protime 36.1 (*)     All other components within normal limits   PROTIME-INR - Abnormal; Notable for the following:     Protime 59.2 (*)     INR 5.8 (*)     All other components within normal limits    Narrative:     CALL  [employer] tel. [phone],  Coag results called to and read back by saida, 12/02/2018 08:35, by Tristan Vallejo   COMPREHENSIVE METABOLIC PANEL - Abnormal; Notable for the following:     CO2 18 (*)     Glucose 125 (*)     Total Protein 5.9 (*)     Alb 2.2 (*)     Total Bilirubin 1.5 (*)     Alkaline Phosphatase 509 (*)     ALT 63 (*)     AST 91 (*)     Globulin 3.7 (*)     All other components within normal limits   CBC WITH AUTO DIFFERENTIAL - Abnormal; Notable for the following:     WBC 2.0 (*)     RBC 2.84 (*)     Hemoglobin 8.8 (*)     Hematocrit 26.9 (*)     MCHC 32.7 (*)     RDW 19.5 (*)     Neutrophils # 1.1 (*)     Lymphocytes # 0.6 (*)     All other components within normal limits   PROTIME-INR - Abnormal; Notable for the following: Protime 44.1 (*)     All other components within normal limits   COMPREHENSIVE METABOLIC PANEL - Abnormal; Notable for the following:     Glucose 141 (*)     Total Protein 5.8 (*)     Alb 2.5 (*)     Total Bilirubin 1.6 (*)     Alkaline Phosphatase 606 (*)     ALT 71 (*)     AST 82 (*)     All other components within normal limits   CBC WITH AUTO DIFFERENTIAL - Abnormal; Notable for the following:     WBC 2.1 (*)     RBC 2.76 (*)     Hemoglobin 8.8 (*)     Hematocrit 25.5 (*)     MCH 31.9 (*)     RDW 20.2 (*)     Neutrophils # 1.2 (*)     Lymphocytes # 0.6 (*)     All other components within normal limits   PROTIME-INR - Abnormal; Notable for the following:     Protime 19.9 (*)     All other components within normal limits   COMPREHENSIVE METABOLIC PANEL - Abnormal; Notable for the following:     Glucose 151 (*)     CREATININE 1.21 (*)     GFR Non- 57.7 (*)     Total Protein 5.9 (*)     Alb 2.5 (*)     Total Bilirubin 1.6 (*)     Alkaline Phosphatase 731 (*)     ALT 71 (*)     AST 81 (*)     All other components within normal limits   CBC WITH AUTO DIFFERENTIAL - Abnormal; Notable for the following:     RBC 2.84 (*)     Hemoglobin 9.0 (*)     Hematocrit 26.4 (*)     MCH 31.6 (*)     RDW 19.4 (*)     All other components within normal limits   HEMOGLOBIN A1C - Abnormal; Notable for the following:     Hemoglobin A1C 6.8 (*)     All other components within normal limits   PROTIME-INR - Abnormal; Notable for the following:     Protime 15.1 (*)     All other components within normal limits   COMPREHENSIVE METABOLIC PANEL - Abnormal; Notable for the following:     Glucose 149 (*)     Total Protein 5.8 (*)     Alb 2.6 (*)     Total Bilirubin 1.9 (*)     Alkaline Phosphatase 737 (*)     ALT 66 (*)     AST 71 (*)     All other components within normal limits   CBC WITH AUTO DIFFERENTIAL - Abnormal; Notable for the following:     WBC 2.4 (*)     RBC 2.81 (*)     Hemoglobin 8.9 (*)     Hematocrit 26.1 (*) weakness    2. Lung infiltrate on CT    3. Dehydration    4. H/O lymphoma    5.  DNR (do not resuscitate)        DISPOSITION/PLAN   DISPOSITION Admitted 11/30/2018 08:47:06 AM      PATIENT REFERRED TO:  Marcia Washington  [address] [place] [address]  [phone]            DISCHARGE MEDICATIONS:  Discharge Medication List as of 12/6/2018  3:08 PM             (Please note that portions of this note were completed with a voice recognitionprogram.  Efforts were made to edit the dictations but occasionally words are mis-transcribed.)    Martha Farmer DO (electronically signed)  Attending Emergency Physician          Matrha Farmer DO  12/23/18 7401

## 2018-12-01 ENCOUNTER — APPOINTMENT (OUTPATIENT)
Dept: CT IMAGING | Age: 78
DRG: 682 | End: 2018-12-01
Payer: MEDICARE

## 2018-12-01 LAB
ALBUMIN SERPL-MCNC: 2.4 G/DL (ref 3.9–4.9)
ALP BLD-CCNC: 377 U/L (ref 35–104)
ALT SERPL-CCNC: 68 U/L (ref 0–41)
ANION GAP SERPL CALCULATED.3IONS-SCNC: 9 MEQ/L (ref 7–13)
ANISOCYTOSIS: PRESENT
AST SERPL-CCNC: 95 U/L (ref 0–40)
BASOPHILS ABSOLUTE: 0 K/UL (ref 0–0.2)
BASOPHILS RELATIVE PERCENT: 0.2 %
BILIRUB SERPL-MCNC: 1.2 MG/DL (ref 0–1.2)
BUN BLDV-MCNC: 20 MG/DL (ref 8–23)
CALCIUM SERPL-MCNC: 9.5 MG/DL (ref 8.6–10.2)
CHLORIDE BLD-SCNC: 103 MEQ/L (ref 98–107)
CO2: 23 MEQ/L (ref 22–29)
CREAT SERPL-MCNC: 1 MG/DL (ref 0.7–1.2)
EOSINOPHILS ABSOLUTE: 0 K/UL (ref 0–0.7)
EOSINOPHILS RELATIVE PERCENT: 1.2 %
GFR AFRICAN AMERICAN: >60
GFR NON-AFRICAN AMERICAN: >60
GLOBULIN: 3.6 G/DL (ref 2.3–3.5)
GLUCOSE BLD-MCNC: 123 MG/DL (ref 74–109)
HCT VFR BLD CALC: 26.7 % (ref 42–52)
HEMOGLOBIN: 9.2 G/DL (ref 14–18)
INR BLD: 3.5
LYMPHOCYTES ABSOLUTE: 0.6 K/UL (ref 1–4.8)
LYMPHOCYTES RELATIVE PERCENT: 30.6 %
MCH RBC QN AUTO: 32.3 PG (ref 27–31.3)
MCHC RBC AUTO-ENTMCNC: 34.6 % (ref 33–37)
MCV RBC AUTO: 93.3 FL (ref 80–100)
MONOCYTES ABSOLUTE: 0.3 K/UL (ref 0.2–0.8)
MONOCYTES RELATIVE PERCENT: 13.1 %
NEUTROPHILS ABSOLUTE: 1.1 K/UL (ref 1.4–6.5)
NEUTROPHILS RELATIVE PERCENT: 54.9 %
PDW BLD-RTO: 19.3 % (ref 11.5–14.5)
PLATELET # BLD: 143 K/UL (ref 130–400)
POTASSIUM SERPL-SCNC: 4.4 MEQ/L (ref 3.5–5.1)
PROTHROMBIN TIME: 36.1 SEC (ref 9.6–12.3)
RBC # BLD: 2.86 M/UL (ref 4.7–6.1)
SODIUM BLD-SCNC: 135 MEQ/L (ref 132–144)
TOTAL PROTEIN: 6 G/DL (ref 6.4–8.1)
WBC # BLD: 2 K/UL (ref 4.8–10.8)

## 2018-12-01 PROCEDURE — 97535 SELF CARE MNGMENT TRAINING: CPT

## 2018-12-01 PROCEDURE — 6360000002 HC RX W HCPCS: Performed by: INTERNAL MEDICINE

## 2018-12-01 PROCEDURE — 36415 COLL VENOUS BLD VENIPUNCTURE: CPT

## 2018-12-01 PROCEDURE — 93005 ELECTROCARDIOGRAM TRACING: CPT

## 2018-12-01 PROCEDURE — 70450 CT HEAD/BRAIN W/O DYE: CPT

## 2018-12-01 PROCEDURE — 97530 THERAPEUTIC ACTIVITIES: CPT

## 2018-12-01 PROCEDURE — 97162 PT EVAL MOD COMPLEX 30 MIN: CPT

## 2018-12-01 PROCEDURE — 6370000000 HC RX 637 (ALT 250 FOR IP): Performed by: INTERNAL MEDICINE

## 2018-12-01 PROCEDURE — 2580000003 HC RX 258: Performed by: INTERNAL MEDICINE

## 2018-12-01 PROCEDURE — 97166 OT EVAL MOD COMPLEX 45 MIN: CPT

## 2018-12-01 PROCEDURE — 1210000000 HC MED SURG R&B

## 2018-12-01 PROCEDURE — G8979 MOBILITY GOAL STATUS: HCPCS

## 2018-12-01 PROCEDURE — 85610 PROTHROMBIN TIME: CPT

## 2018-12-01 PROCEDURE — 80053 COMPREHEN METABOLIC PANEL: CPT

## 2018-12-01 PROCEDURE — 85025 COMPLETE CBC W/AUTO DIFF WBC: CPT

## 2018-12-01 PROCEDURE — 97116 GAIT TRAINING THERAPY: CPT

## 2018-12-01 PROCEDURE — G8978 MOBILITY CURRENT STATUS: HCPCS

## 2018-12-01 RX ORDER — SODIUM CHLORIDE 9 MG/ML
INJECTION, SOLUTION INTRAVENOUS CONTINUOUS
Status: DISCONTINUED | OUTPATIENT
Start: 2018-12-01 | End: 2018-12-02

## 2018-12-01 RX ORDER — INSULIN GLARGINE 100 [IU]/ML
10 INJECTION, SOLUTION SUBCUTANEOUS NIGHTLY
COMMUNITY

## 2018-12-01 RX ADMIN — CEFTRIAXONE 1 G: 1 INJECTION, POWDER, FOR SOLUTION INTRAMUSCULAR; INTRAVENOUS at 09:15

## 2018-12-01 RX ADMIN — ASPIRIN 81 MG 81 MG: 81 TABLET ORAL at 09:16

## 2018-12-01 RX ADMIN — PIPERACILLIN SODIUM AND TAZOBACTAM SODIUM 3.38 G: 3; .375 INJECTION, POWDER, LYOPHILIZED, FOR SOLUTION INTRAVENOUS at 20:27

## 2018-12-01 RX ADMIN — METOPROLOL SUCCINATE 25 MG: 25 TABLET, FILM COATED, EXTENDED RELEASE ORAL at 09:16

## 2018-12-01 RX ADMIN — SODIUM CHLORIDE: 9 INJECTION, SOLUTION INTRAVENOUS at 02:13

## 2018-12-01 RX ADMIN — AZITHROMYCIN MONOHYDRATE 500 MG: 500 INJECTION, POWDER, LYOPHILIZED, FOR SOLUTION INTRAVENOUS at 09:16

## 2018-12-01 RX ADMIN — ISOSORBIDE MONONITRATE 30 MG: 30 TABLET, EXTENDED RELEASE ORAL at 09:16

## 2018-12-01 RX ADMIN — SODIUM CHLORIDE: 9 INJECTION, SOLUTION INTRAVENOUS at 13:00

## 2018-12-01 ASSESSMENT — PAIN SCALES - GENERAL
PAINLEVEL_OUTOF10: 0

## 2018-12-01 NOTE — PROGRESS NOTES
Occupational Therapy  Medical Daily Treatment Note  NAME: Laci Moore  : 1940  MRN: [id number]    Date of Service: 2018    Patient Diagnosis(es): The primary encounter diagnosis was General weakness. Diagnoses of Lung infiltrate on CT, Dehydration, H/O lymphoma, and DNR (do not resuscitate) were also pertinent to this visit. 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 has a past surgical history that includes Coronary artery bypass graft; eye surgery; Upper gastrointestinal endoscopy; Brain Biopsy; brain surgery; and lymph node biopsy. Restrictions     Subjective   General  Chart Reviewed: Yes  Patient assessed for rehabilitation services?: Yes  Pain Assessment  Patient Currently in Pain: No  Pain Level: 0  Vital Signs  Patient Currently in Pain: No   Orientation  Orientation  Overall Orientation Status: Impaired  Objective    ADL  Feeding: Moderate assistance  Grooming: Minimal assistance  UE Bathing: Maximum assistance  LE Bathing: Dependent/Total  UE Dressing: Moderate assistance;Maximum assistance  LE Dressing: Dependent/Total  Toileting: Maximum assistance        Balance  Sitting Balance: Supervision  Standing Balance: Minimal assistance  Standing Balance  Sit to stand: Moderate assistance  Stand to sit: Moderate assistance  Functional Mobility  Functional - Mobility Device: Rolling Walker  Activity: Other  Assist Level: Minimal assistance  Toilet Transfers  Toilet Transfer: Unable to assess  Tub Transfers  Tub Transfers: Not tested     Transfers  Stand Step Transfers: Minimal assistance  Sit to stand:  Moderate assistance  Stand to sit: Moderate assistance                       Cognition  Overall

## 2018-12-01 NOTE — PROGRESS NOTES
Pt has been reoriented to time day and surroundings, pt has no c/o pain this am, pt repositioned for comfort, pt on RA, no distress noted, condom cath noted in place, pt to have CT of head per MD orders, wife's questions have been addressed this am, pt Warfarin held this am per MD orders, pt INR 3.5, pt safety maintained pt call light has been placed within reach.

## 2018-12-01 NOTE — PROGRESS NOTES
Pt has been taken to CT and is back in room pt tolerated ambulation well with assistance x 2 and gait belt utilized, pt wife remains at bedside, pt safety maintained, pt has been reoriented to call light, call light has been placed within reach.

## 2018-12-01 NOTE — PROGRESS NOTES
Physical Therapy    Facility/Department: [facility] MED SURG UNIT  Initial Assessment -MS pace  NAME: Concetta Alvarenga  : 1940  MRN: [id number]    Date of Service: 2018    Discharge Recommendations:  [address]        Patient Diagnosis(es): The primary encounter diagnosis was General weakness. Diagnoses of Lung infiltrate on CT, Dehydration, H/O lymphoma, and DNR (do not resuscitate) were also pertinent to this visit. 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 has a past surgical history that includes Coronary artery bypass graft; eye surgery; Upper gastrointestinal endoscopy; Brain Biopsy; brain surgery; and lymph node biopsy.     Restrictions     Vision/Hearing        Subjective  General  Chart Reviewed: Yes  Patient assessed for rehabilitation services?: Yes  Family / Caregiver Present: Yes (wife Rosanne Mathews)  Referring Practitioner: Dl Polanco  Referral Date : 18  Diagnosis: Pneumonia  Subjective  Subjective: Pt reports not having any pain currently  Pain Screening  Patient Currently in Pain: No  Pain Assessment  Pain Level: 0  Vital Signs  Patient Currently in Pain: No       Orientation     Social/Functional History  Social/Functional History  Lives With: Spouse  Type of Home: House  Home Layout: Two level  Home Access: Stairs to enter with rails  Entrance Stairs - Number of Steps: 7 steps - railing on right  Entrance Stairs - Rails: Right  Bathroom Shower/Tub: Tub/Shower unit, Walk-in shower  Bathroom Toilet: Standard  Bathroom Equipment: Tub transfer bench (no grab bars in bathroom)  Bathroom Accessibility: Walker accessible  Home Equipment: Rolling walker, [employer] Help From: Family

## 2018-12-01 NOTE — PROGRESS NOTES
MD verbally spoke with RN in am regarding pt IVF rate change, rate change was initiated in am, after order review no order noted, charge nurse perfect serv MD and obtained and clarified official order.

## 2018-12-02 LAB
ALBUMIN SERPL-MCNC: 2.2 G/DL (ref 3.9–4.9)
ALP BLD-CCNC: 509 U/L (ref 35–104)
ALT SERPL-CCNC: 63 U/L (ref 0–41)
ANION GAP SERPL CALCULATED.3IONS-SCNC: 13 MEQ/L (ref 7–13)
ANISOCYTOSIS: PRESENT
AST SERPL-CCNC: 91 U/L (ref 0–40)
BASOPHILS ABSOLUTE: 0 K/UL (ref 0–0.2)
BASOPHILS RELATIVE PERCENT: 0.3 %
BILIRUB SERPL-MCNC: 1.5 MG/DL (ref 0–1.2)
BUN BLDV-MCNC: 16 MG/DL (ref 8–23)
CALCIUM SERPL-MCNC: 9.3 MG/DL (ref 8.6–10.2)
CHLORIDE BLD-SCNC: 103 MEQ/L (ref 98–107)
CO2: 18 MEQ/L (ref 22–29)
CREAT SERPL-MCNC: 0.9 MG/DL (ref 0.7–1.2)
EOSINOPHILS ABSOLUTE: 0 K/UL (ref 0–0.7)
EOSINOPHILS RELATIVE PERCENT: 0.8 %
GFR AFRICAN AMERICAN: >60
GFR NON-AFRICAN AMERICAN: >60
GLOBULIN: 3.7 G/DL (ref 2.3–3.5)
GLUCOSE BLD-MCNC: 125 MG/DL (ref 74–109)
HAV IGM SER IA-ACNC: NORMAL
HCT VFR BLD CALC: 26.9 % (ref 42–52)
HEMOGLOBIN: 8.8 G/DL (ref 14–18)
HEPATITIS B CORE IGM ANTIBODY: NORMAL
HEPATITIS B SURFACE ANTIGEN INTERPRETATION: NORMAL
HEPATITIS C ANTIBODY INTERPRETATION: NORMAL
HEPATITIS INTERPRETATION:: NORMAL
INR BLD: 5.8
LYMPHOCYTES ABSOLUTE: 0.6 K/UL (ref 1–4.8)
LYMPHOCYTES RELATIVE PERCENT: 28.4 %
MCH RBC QN AUTO: 31 PG (ref 27–31.3)
MCHC RBC AUTO-ENTMCNC: 32.7 % (ref 33–37)
MCV RBC AUTO: 94.8 FL (ref 80–100)
MONOCYTES ABSOLUTE: 0.3 K/UL (ref 0.2–0.8)
MONOCYTES RELATIVE PERCENT: 14.6 %
NEUTROPHILS ABSOLUTE: 1.1 K/UL (ref 1.4–6.5)
NEUTROPHILS RELATIVE PERCENT: 55.9 %
PDW BLD-RTO: 19.5 % (ref 11.5–14.5)
PLATELET # BLD: 144 K/UL (ref 130–400)
POTASSIUM SERPL-SCNC: 4.1 MEQ/L (ref 3.5–5.1)
PROTHROMBIN TIME: 59.2 SEC (ref 9.6–12.3)
RBC # BLD: 2.84 M/UL (ref 4.7–6.1)
SODIUM BLD-SCNC: 134 MEQ/L (ref 132–144)
TOTAL PROTEIN: 5.9 G/DL (ref 6.4–8.1)
WBC # BLD: 2 K/UL (ref 4.8–10.8)

## 2018-12-02 PROCEDURE — 97530 THERAPEUTIC ACTIVITIES: CPT

## 2018-12-02 PROCEDURE — 85025 COMPLETE CBC W/AUTO DIFF WBC: CPT

## 2018-12-02 PROCEDURE — 2580000003 HC RX 258: Performed by: INTERNAL MEDICINE

## 2018-12-02 PROCEDURE — 97110 THERAPEUTIC EXERCISES: CPT

## 2018-12-02 PROCEDURE — 80074 ACUTE HEPATITIS PANEL: CPT

## 2018-12-02 PROCEDURE — 85610 PROTHROMBIN TIME: CPT

## 2018-12-02 PROCEDURE — 6360000002 HC RX W HCPCS: Performed by: INTERNAL MEDICINE

## 2018-12-02 PROCEDURE — 6370000000 HC RX 637 (ALT 250 FOR IP): Performed by: INTERNAL MEDICINE

## 2018-12-02 PROCEDURE — 80053 COMPREHEN METABOLIC PANEL: CPT

## 2018-12-02 PROCEDURE — 36415 COLL VENOUS BLD VENIPUNCTURE: CPT

## 2018-12-02 PROCEDURE — 1210000000 HC MED SURG R&B

## 2018-12-02 RX ORDER — PHYTONADIONE 10 MG/ML
2 INJECTION, EMULSION INTRAMUSCULAR; INTRAVENOUS; SUBCUTANEOUS ONCE
Status: COMPLETED | OUTPATIENT
Start: 2018-12-02 | End: 2018-12-02

## 2018-12-02 RX ADMIN — METOPROLOL SUCCINATE 25 MG: 25 TABLET, FILM COATED, EXTENDED RELEASE ORAL at 08:15

## 2018-12-02 RX ADMIN — PIPERACILLIN SODIUM AND TAZOBACTAM SODIUM 3.38 G: 3; .375 INJECTION, POWDER, LYOPHILIZED, FOR SOLUTION INTRAVENOUS at 02:05

## 2018-12-02 RX ADMIN — ISOSORBIDE MONONITRATE 30 MG: 30 TABLET, EXTENDED RELEASE ORAL at 08:16

## 2018-12-02 RX ADMIN — ASPIRIN 81 MG 81 MG: 81 TABLET ORAL at 08:15

## 2018-12-02 RX ADMIN — AZITHROMYCIN MONOHYDRATE 500 MG: 500 INJECTION, POWDER, LYOPHILIZED, FOR SOLUTION INTRAVENOUS at 08:15

## 2018-12-02 RX ADMIN — PIPERACILLIN SODIUM AND TAZOBACTAM SODIUM 3.38 G: 3; .375 INJECTION, POWDER, LYOPHILIZED, FOR SOLUTION INTRAVENOUS at 17:12

## 2018-12-02 RX ADMIN — PIPERACILLIN SODIUM AND TAZOBACTAM SODIUM 3.38 G: 3; .375 INJECTION, POWDER, LYOPHILIZED, FOR SOLUTION INTRAVENOUS at 10:24

## 2018-12-02 RX ADMIN — PHYTONADIONE 2 MG: 10 INJECTION, EMULSION INTRAMUSCULAR; INTRAVENOUS; SUBCUTANEOUS at 10:37

## 2018-12-02 ASSESSMENT — PAIN SCALES - WONG BAKER
WONGBAKER_NUMERICALRESPONSE: 0

## 2018-12-02 ASSESSMENT — PAIN SCALES - GENERAL
PAINLEVEL_OUTOF10: 0

## 2018-12-02 NOTE — PLAN OF CARE
Problem: Risk for Impaired Skin Integrity  Goal: Tissue integrity - skin and mucous membranes  Structural intactness and normal physiological function of skin and  mucous membranes. Outcome: Ongoing      Problem: Falls - Risk of:  Goal: Will remain free from falls  Will remain free from falls   Outcome: Ongoing    Goal: Absence of physical injury  Absence of physical injury   Outcome: Ongoing      Problem: DAILY CARE  Goal: Daily care needs are met  Outcome: Ongoing      Problem: PAIN  Goal: Patient's pain/discomfort is manageable  Outcome: Ongoing      Problem: KNOWLEDGE DEFICIT  Goal: Patient/S.O. demonstrates understanding of disease process, treatment plan, medications, and discharge instructions.   Outcome: Ongoing      Problem: DISCHARGE BARRIERS  Goal: Patient's continuum of care needs are met  Outcome: Ongoing

## 2018-12-02 NOTE — PROGRESS NOTES
Physical Therapy  Facility/Department: [facility] MED SURG UNIT  Daily Treatment Note  NAME: Lola Church  : 1940  MRN: [id number]    Date of Service: 2018    Discharge Recommendations:  [address]        Patient Diagnosis(es): The primary encounter diagnosis was General weakness. Diagnoses of Lung infiltrate on CT, Dehydration, H/O lymphoma, and DNR (do not resuscitate) were also pertinent to this visit. 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 has a past surgical history that includes Coronary artery bypass graft; eye surgery; Upper gastrointestinal endoscopy; Brain Biopsy; brain surgery; and lymph node biopsy. Restrictions     Subjective   General  Chart Reviewed: Yes  Family / Caregiver Present: Yes (wife Nadeem Paniagua)  Referring Practitioner: Clayton Alvarenga  Subjective  Subjective: Pt lying in bed upon arrival and agrees to therapy. General Comment  Comments: wife is present and pt seems confused and cant recall his birthday. Pain Screening  Patient Currently in Pain: No  Pain Assessment  [place] Pain Rating: No hurt  Response to Pain Intervention: Patient Satisfied  Vital Signs  Level of Consciousness: Alert  Patient Currently in Pain: No  Oxygen Therapy  O2 Device: None (Room air)       Orientation     Cognition      Objective         Ambulation  Ambulation?: No        Exercises  Quad Sets: 10x  Heelslides: 2x10 AAROM  Hip Flexion: 2x10 AAROM  Hip Abduction: 2x10 AAROM  Knee Short Arc Quad: 2x10 AAROM  Ankle Pumps: 2x10 AAROM  Comments: need vc and tacticle cues to participate.  pt falling asleep                        Assessment   Body structures, Functions, Activity limitations: Decreased

## 2018-12-02 NOTE — PROGRESS NOTES
Uneventful night. No chest pain or abdominal pain. No focal weakness or numbness. Generalized functional impairment and the weakness. Moderate to advanced cognitive loss. ROS: 12 system review otherwise is negative for acute signs or symptoms over the last 24 hrs.      General appearance: alert, appears stated age and cooperative, moderate acute distress  Skin: Skin color, texture, turgor normal. No rashes or lesions  HEENT: Head: Normocephalic, no lesions, without obvious abnormality. Neck: no adenopathy, no carotid bruit, no JVD, supple, symmetrical, trachea midline and thyroid not enlarged, symmetric, no tenderness/mass/nodules  Lungs: Basilar rhonchi mostly at the left base. Heart: regular rate and rhythm, S1, S2 normal, no murmur, click, rub or gallop  Abdomen: soft, non-tender; bowel sounds normal; no masses,  no organomegaly  Extremities: extremities normal, atraumatic, no cyanosis or edema  Neurologic: Mental status: Patient is awake. [facility] is disoriented to time place and location. [facility] has moderate to advanced cognitive loss.  Is able to follow simple commands.  He moves his extremities symmetrically.  He is barely able to lift up his extremities against gravity but not against resistance.         Assessment and plan: May or may not be complete, inclusive or conclusive.     * Pneumonia.  Consolidations seen on the CT of the chest although patient does not exhibit any significant respiratory symptoms.     * Suspected but not confirmed chronic Versus acute cholecystitis based on elevated liver enzymes as well as an abnormal sonogram.  Change antibiotic to Zosyn and request HIDA scan on Monday.     *Generalized weakness, nonfocal, probable progressive decline of his functional status secondary to his neoplastic disease in the setting of acute pneumonia, dehydration and renal failure.     *Dehydration and volume loss. Improved     *Acute renal failure, secondary to above.  Urine analysis is a bland. Resolved     *Reported lymphoma for which the patient is on CHEMOTHERAPY.     *Elevated troponin, no chest pain, probable type II non-ST elevation myocardial infarction secondary to metabolic issues.  No evidence of this time to suggest acute coronary syndrome.     *Cognitive impairment and loss. Moderate to advanced degree.     * Functional impairment, advanced degree.     *Diabetes mellitus.     *Chronic DVT already on Coumadin with therapeutic INR. Today's INR is 5.3 therefore we will hold Coumadin.     Plan:  Change antibiotic to Zosyn and Zithromax for pneumonia and suspected cholecystitis. Reduce to 50 mL an hour. HIDA scan on Monday. PT OT eval and treatment. Case management consultation. The patient will definitely benefit from inpatient physical and occupational therapy and will qualify for that given his advanced disability. Conservative and medical treatment for his nondistended myocardial infarction. Long discussion with the wife about aggressive, invasive versus noninvasive and a conservative approach. She is definitely on the side of  conservative compassionate approach and would not permit for any escalation for work up or therapy escalation.        I may or may not have addressed all of this pt symptoms, medical issues, abnormal labs and findings. Pt will need additional work up, investigation, testing, surveillance, and treatment to be done at a later time and date during this hospitalization or post discharge by PCP and other out pt providers. Portion of patient care is managed by other providers. Please refer to their notes for details.   Further workup and plan will be determined based on the clinical progression and a follow

## 2018-12-03 ENCOUNTER — APPOINTMENT (OUTPATIENT)
Dept: NUCLEAR MEDICINE | Age: 78
DRG: 682 | End: 2018-12-03
Payer: MEDICARE

## 2018-12-03 LAB
ALBUMIN SERPL-MCNC: 2.5 G/DL (ref 3.9–4.9)
ALP BLD-CCNC: 606 U/L (ref 35–104)
ALT SERPL-CCNC: 71 U/L (ref 0–41)
ANION GAP SERPL CALCULATED.3IONS-SCNC: 11 MEQ/L (ref 7–13)
ANISOCYTOSIS: PRESENT
AST SERPL-CCNC: 82 U/L (ref 0–40)
BASOPHILS ABSOLUTE: 0 K/UL (ref 0–0.2)
BASOPHILS RELATIVE PERCENT: 0.5 %
BILIRUB SERPL-MCNC: 1.6 MG/DL (ref 0–1.2)
BUN BLDV-MCNC: 15 MG/DL (ref 8–23)
CALCIUM SERPL-MCNC: 9.4 MG/DL (ref 8.6–10.2)
CHLORIDE BLD-SCNC: 103 MEQ/L (ref 98–107)
CO2: 23 MEQ/L (ref 22–29)
CREAT SERPL-MCNC: 1.1 MG/DL (ref 0.7–1.2)
EOSINOPHILS ABSOLUTE: 0 K/UL (ref 0–0.7)
EOSINOPHILS RELATIVE PERCENT: 0.8 %
GFR AFRICAN AMERICAN: >60
GFR NON-AFRICAN AMERICAN: >60
GLOBULIN: 3.3 G/DL (ref 2.3–3.5)
GLUCOSE BLD-MCNC: 141 MG/DL (ref 74–109)
HCT VFR BLD CALC: 25.5 % (ref 42–52)
HEMOGLOBIN: 8.8 G/DL (ref 14–18)
INR BLD: 4.3
LYMPHOCYTES ABSOLUTE: 0.6 K/UL (ref 1–4.8)
LYMPHOCYTES RELATIVE PERCENT: 27.6 %
MCH RBC QN AUTO: 31.9 PG (ref 27–31.3)
MCHC RBC AUTO-ENTMCNC: 34.5 % (ref 33–37)
MCV RBC AUTO: 92.5 FL (ref 80–100)
MONOCYTES ABSOLUTE: 0.3 K/UL (ref 0.2–0.8)
MONOCYTES RELATIVE PERCENT: 15.5 %
NEUTROPHILS ABSOLUTE: 1.2 K/UL (ref 1.4–6.5)
NEUTROPHILS RELATIVE PERCENT: 55.6 %
PDW BLD-RTO: 20.2 % (ref 11.5–14.5)
PLATELET # BLD: 157 K/UL (ref 130–400)
POTASSIUM SERPL-SCNC: 3.9 MEQ/L (ref 3.5–5.1)
PROTHROMBIN TIME: 44.1 SEC (ref 9.6–12.3)
RBC # BLD: 2.76 M/UL (ref 4.7–6.1)
SODIUM BLD-SCNC: 137 MEQ/L (ref 132–144)
TOTAL PROTEIN: 5.8 G/DL (ref 6.4–8.1)
WBC # BLD: 2.1 K/UL (ref 4.8–10.8)

## 2018-12-03 PROCEDURE — 6370000000 HC RX 637 (ALT 250 FOR IP): Performed by: INTERNAL MEDICINE

## 2018-12-03 PROCEDURE — 1210000000 HC MED SURG R&B

## 2018-12-03 PROCEDURE — 3430000000 HC RX DIAGNOSTIC RADIOPHARMACEUTICAL: Performed by: INTERNAL MEDICINE

## 2018-12-03 PROCEDURE — 97530 THERAPEUTIC ACTIVITIES: CPT

## 2018-12-03 PROCEDURE — 85610 PROTHROMBIN TIME: CPT

## 2018-12-03 PROCEDURE — 80053 COMPREHEN METABOLIC PANEL: CPT

## 2018-12-03 PROCEDURE — 6360000002 HC RX W HCPCS: Performed by: INTERNAL MEDICINE

## 2018-12-03 PROCEDURE — 36415 COLL VENOUS BLD VENIPUNCTURE: CPT

## 2018-12-03 PROCEDURE — 85025 COMPLETE CBC W/AUTO DIFF WBC: CPT

## 2018-12-03 PROCEDURE — 2580000003 HC RX 258: Performed by: INTERNAL MEDICINE

## 2018-12-03 PROCEDURE — 97110 THERAPEUTIC EXERCISES: CPT

## 2018-12-03 PROCEDURE — 2709999900 NM HEPATOBILIARY SCAN W PHARMACOLOGICAL INTERVENTION

## 2018-12-03 PROCEDURE — 97116 GAIT TRAINING THERAPY: CPT

## 2018-12-03 PROCEDURE — A9537 TC99M MEBROFENIN: HCPCS | Performed by: INTERNAL MEDICINE

## 2018-12-03 RX ORDER — SODIUM CHLORIDE 0.9 % (FLUSH) 0.9 %
10 SYRINGE (ML) INJECTION PRN
Status: DISCONTINUED | OUTPATIENT
Start: 2018-12-03 | End: 2018-12-06 | Stop reason: HOSPADM

## 2018-12-03 RX ADMIN — PIPERACILLIN SODIUM AND TAZOBACTAM SODIUM 3.38 G: 3; .375 INJECTION, POWDER, LYOPHILIZED, FOR SOLUTION INTRAVENOUS at 03:21

## 2018-12-03 RX ADMIN — PIPERACILLIN SODIUM AND TAZOBACTAM SODIUM 3.38 G: 3; .375 INJECTION, POWDER, LYOPHILIZED, FOR SOLUTION INTRAVENOUS at 09:20

## 2018-12-03 RX ADMIN — PIPERACILLIN SODIUM AND TAZOBACTAM SODIUM 3.38 G: 3; .375 INJECTION, POWDER, LYOPHILIZED, FOR SOLUTION INTRAVENOUS at 19:35

## 2018-12-03 RX ADMIN — Medication 10 ML: at 17:01

## 2018-12-03 RX ADMIN — ISOSORBIDE MONONITRATE 30 MG: 30 TABLET, EXTENDED RELEASE ORAL at 07:30

## 2018-12-03 RX ADMIN — ASPIRIN 81 MG 81 MG: 81 TABLET ORAL at 07:30

## 2018-12-03 RX ADMIN — AZITHROMYCIN MONOHYDRATE 500 MG: 500 INJECTION, POWDER, LYOPHILIZED, FOR SOLUTION INTRAVENOUS at 07:30

## 2018-12-03 RX ADMIN — MEBROFENIN 7 MILLICURIE: 45 INJECTION, POWDER, LYOPHILIZED, FOR SOLUTION INTRAVENOUS at 17:01

## 2018-12-03 RX ADMIN — METOPROLOL SUCCINATE 25 MG: 25 TABLET, FILM COATED, EXTENDED RELEASE ORAL at 07:29

## 2018-12-03 ASSESSMENT — PAIN SCALES - GENERAL
PAINLEVEL_OUTOF10: 0
PAINLEVEL_OUTOF10: 0

## 2018-12-03 ASSESSMENT — PAIN SCALES - WONG BAKER: WONGBAKER_NUMERICALRESPONSE: 0

## 2018-12-03 NOTE — PROGRESS NOTES
Physical Therapy  Facility/Department: [facility] MED SURG UNIT  Daily Treatment Note  NAME: Fabricio Mendiola  : 1940  MRN: [id number]    Date of Service: 12/3/2018    Discharge Recommendations:  [address]        Patient Diagnosis(es): The primary encounter diagnosis was General weakness. Diagnoses of Lung infiltrate on CT, Dehydration, H/O lymphoma, and DNR (do not resuscitate) were also pertinent to this visit. 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 has a past surgical history that includes Coronary artery bypass graft; eye surgery; Upper gastrointestinal endoscopy; Brain Biopsy; brain surgery; and lymph node biopsy. Restrictions     Subjective   General  Chart Reviewed: Yes  Family / Caregiver Present: Yes (wife Annie Chisholm)  Referring Practitioner: Vina Collet  Subjective  Subjective: Pt lying in bed upon arrival and agrees to therapy.   Pain Screening  Patient Currently in Pain: No         Orientation     Cognition      Objective      Transfers  Supine to sit min/modA with trunk  Sit to Stand: Contact guard assistance  Stand to sit: Contact guard assistance  Ambulation  Ambulation?: Yes  Ambulation 1  Surface: level tile  Device: Rolling Walker  Assistance: Contact guard assistance;Minimal assistance  Quality of Gait: slow pace, flexed posture,  Distance: 3' bed to recliner  Comments: Pt needed a lot of time to complete tasks and needed constant cues to use walker safely        Exercises  Quad Sets: 10x  Heelslides: 10x  Hip Flexion: 10x  Hip Abduction: 10x  Knee Long Arc Quad: 10x  Ankle Pumps: 2x10                         Assessment   Body structures, Functions, Activity limitations: Decreased

## 2018-12-04 LAB
ALBUMIN SERPL-MCNC: 2.5 G/DL (ref 3.9–4.9)
ALP BLD-CCNC: 731 U/L (ref 35–104)
ALT SERPL-CCNC: 71 U/L (ref 0–41)
ANION GAP SERPL CALCULATED.3IONS-SCNC: 12 MEQ/L (ref 7–13)
ANISOCYTOSIS: PRESENT
AST SERPL-CCNC: 81 U/L (ref 0–40)
BASOPHILS RELATIVE PERCENT: 0 %
BILIRUB SERPL-MCNC: 1.6 MG/DL (ref 0–1.2)
BUN BLDV-MCNC: 17 MG/DL (ref 8–23)
CALCIUM SERPL-MCNC: 9.9 MG/DL (ref 8.6–10.2)
CHLORIDE BLD-SCNC: 104 MEQ/L (ref 98–107)
CO2: 23 MEQ/L (ref 22–29)
CREAT SERPL-MCNC: 1.21 MG/DL (ref 0.7–1.2)
EOSINOPHILS RELATIVE PERCENT: 1.1 %
GFR AFRICAN AMERICAN: >60
GFR NON-AFRICAN AMERICAN: 57.7
GLOBULIN: 3.4 G/DL (ref 2.3–3.5)
GLUCOSE BLD-MCNC: 151 MG/DL (ref 74–109)
HBA1C MFR BLD: 6.8 % (ref 4.8–5.9)
HCT VFR BLD CALC: 26.4 % (ref 42–52)
HEMOGLOBIN: 9 G/DL (ref 14–18)
INR BLD: 2
LYMPHOCYTES RELATIVE PERCENT: 20.1 %
MCH RBC QN AUTO: 31.6 PG (ref 27–31.3)
MCHC RBC AUTO-ENTMCNC: 34 % (ref 33–37)
MCV RBC AUTO: 92.9 FL (ref 80–100)
MONOCYTES RELATIVE PERCENT: 22.1 %
NEUTROPHILS RELATIVE PERCENT: 56.7 %
PDW BLD-RTO: 19.4 % (ref 11.5–14.5)
PLATELET # BLD: 137 K/UL (ref 130–400)
POTASSIUM SERPL-SCNC: 3.7 MEQ/L (ref 3.5–5.1)
PRO-BNP: 604 PG/ML
PROTHROMBIN TIME: 19.9 SEC (ref 9.6–12.3)
RBC # BLD: 2.84 M/UL (ref 4.7–6.1)
SODIUM BLD-SCNC: 139 MEQ/L (ref 132–144)
TOTAL PROTEIN: 5.9 G/DL (ref 6.4–8.1)
WBC # BLD: ABNORMAL K/UL (ref 4.8–10.8)

## 2018-12-04 PROCEDURE — 97530 THERAPEUTIC ACTIVITIES: CPT

## 2018-12-04 PROCEDURE — 80053 COMPREHEN METABOLIC PANEL: CPT

## 2018-12-04 PROCEDURE — 1210000000 HC MED SURG R&B

## 2018-12-04 PROCEDURE — 97110 THERAPEUTIC EXERCISES: CPT

## 2018-12-04 PROCEDURE — 85610 PROTHROMBIN TIME: CPT

## 2018-12-04 PROCEDURE — 83036 HEMOGLOBIN GLYCOSYLATED A1C: CPT

## 2018-12-04 PROCEDURE — 85025 COMPLETE CBC W/AUTO DIFF WBC: CPT

## 2018-12-04 PROCEDURE — 6360000002 HC RX W HCPCS: Performed by: INTERNAL MEDICINE

## 2018-12-04 PROCEDURE — 2580000003 HC RX 258: Performed by: INTERNAL MEDICINE

## 2018-12-04 PROCEDURE — 6370000000 HC RX 637 (ALT 250 FOR IP): Performed by: INTERNAL MEDICINE

## 2018-12-04 PROCEDURE — 97116 GAIT TRAINING THERAPY: CPT

## 2018-12-04 PROCEDURE — 36415 COLL VENOUS BLD VENIPUNCTURE: CPT

## 2018-12-04 PROCEDURE — 83880 ASSAY OF NATRIURETIC PEPTIDE: CPT

## 2018-12-04 RX ORDER — ACETAMINOPHEN 325 MG/1
650 TABLET ORAL EVERY 6 HOURS PRN
Status: DISCONTINUED | OUTPATIENT
Start: 2018-12-04 | End: 2018-12-06 | Stop reason: HOSPADM

## 2018-12-04 RX ORDER — WARFARIN SODIUM 3 MG/1
1.5 TABLET ORAL
Status: COMPLETED | OUTPATIENT
Start: 2018-12-04 | End: 2018-12-04

## 2018-12-04 RX ORDER — SODIUM CHLORIDE 9 MG/ML
INJECTION, SOLUTION INTRAVENOUS ONCE
Status: COMPLETED | OUTPATIENT
Start: 2018-12-04 | End: 2018-12-04

## 2018-12-04 RX ADMIN — AZITHROMYCIN MONOHYDRATE 500 MG: 500 INJECTION, POWDER, LYOPHILIZED, FOR SOLUTION INTRAVENOUS at 08:22

## 2018-12-04 RX ADMIN — WARFARIN SODIUM 1.5 MG: 3 TABLET ORAL at 18:01

## 2018-12-04 RX ADMIN — ACETAMINOPHEN 650 MG: 325 TABLET ORAL at 00:43

## 2018-12-04 RX ADMIN — PIPERACILLIN SODIUM AND TAZOBACTAM SODIUM 3.38 G: 3; .375 INJECTION, POWDER, LYOPHILIZED, FOR SOLUTION INTRAVENOUS at 11:04

## 2018-12-04 RX ADMIN — SODIUM CHLORIDE: 9 INJECTION, SOLUTION INTRAVENOUS at 11:04

## 2018-12-04 RX ADMIN — PIPERACILLIN SODIUM AND TAZOBACTAM SODIUM 3.38 G: 3; .375 INJECTION, POWDER, LYOPHILIZED, FOR SOLUTION INTRAVENOUS at 18:01

## 2018-12-04 RX ADMIN — ASPIRIN 81 MG 81 MG: 81 TABLET ORAL at 08:26

## 2018-12-04 RX ADMIN — ISOSORBIDE MONONITRATE 30 MG: 30 TABLET, EXTENDED RELEASE ORAL at 08:26

## 2018-12-04 RX ADMIN — PIPERACILLIN SODIUM AND TAZOBACTAM SODIUM 3.38 G: 3; .375 INJECTION, POWDER, LYOPHILIZED, FOR SOLUTION INTRAVENOUS at 02:46

## 2018-12-04 RX ADMIN — METOPROLOL SUCCINATE 25 MG: 25 TABLET, FILM COATED, EXTENDED RELEASE ORAL at 08:28

## 2018-12-04 ASSESSMENT — PAIN SCALES - WONG BAKER: WONGBAKER_NUMERICALRESPONSE: 0

## 2018-12-04 ASSESSMENT — PAIN SCALES - GENERAL
PAINLEVEL_OUTOF10: 0
PAINLEVEL_OUTOF10: 0

## 2018-12-04 NOTE — PROGRESS NOTES
12/02/18   0802  12/03/18   0449  12/04/18   0536   NA  134  137  139   K  4.1  3.9  3.7   CL  103  103  104   CO2  18*  23  23   BUN  16  15  17   CREATININE  0.90  1.10  1.21*   CALCIUM  9.3  9.4  9.9     Recent Labs      12/02/18   0802  12/03/18   0449  12/04/18   0536   AST  91*  82*  81*   ALT  63*  71*  71*   BILITOT  1.5*  1.6*  1.6*   ALKPHOS  509*  606*  731*     Recent Labs      12/02/18   0802  12/03/18   0449  12/04/18   0536   INR  5.8*  4.3  2.0     No results for input(s): Glorious Bunde in the last 72 hours. Urinalysis:      Lab Results   Component Value Date    NITRU Negative 11/30/2018    WBCUA 0-2 11/14/2017    BACTERIA Few 11/14/2017    RBCUA 0-2 11/14/2017    BLOODU Negative 11/30/2018    SPECGRAV 1.020 11/30/2018    GLUCOSEU Negative 11/30/2018       Radiology:  NM HEPATOBILIARY SCAN W EJECTION FRACTION   Final Result   NORMAL RADIONUCLIDE HEPATOBILIARY SCAN (HIDA) WITH A NORMAL GALLBLADDER EJECTION FRACTION. CT HEAD WO CONTRAST   Final Result      NO ACUTE INTRA-AXIAL OR EXTRA-AXIAL FINDINGS. IF SIGNS OR SYMPTOMS PERSIST THEN CONSIDER REPEAT CT SCAN IN 12 TO 24 HOURS OR MRI TO FURTHER EVALUATE IF THERE ARE NO CONTRAINDICATIONS            All CT scans at this facility use dose modulation, iterative reconstruction, and/or weight based dosing when appropriate to reduce radiation dose to as low as reasonably achievable. US ABDOMEN LIMITED   Final Result      THERE IS TRACE PERICHOLECYSTIC FLUID. THE GALLBLADDER WALL IS THICKENED. THICKENING OF THE GALLBLADDER WALL IS A NONSPECIFIC FINDING CAN BE SEEN IN MULTIPLE ETIOLOGIES. CORRELATE CLINICALLY. CORRELATE CLINICALLY. 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  NO

## 2018-12-05 ENCOUNTER — APPOINTMENT (OUTPATIENT)
Dept: GENERAL RADIOLOGY | Age: 78
DRG: 682 | End: 2018-12-05
Payer: MEDICARE

## 2018-12-05 LAB
ALBUMIN SERPL-MCNC: 2.6 G/DL (ref 3.9–4.9)
ALP BLD-CCNC: 737 U/L (ref 35–104)
ALT SERPL-CCNC: 66 U/L (ref 0–41)
ANION GAP SERPL CALCULATED.3IONS-SCNC: 11 MEQ/L (ref 7–13)
ANISOCYTOSIS: PRESENT
AST SERPL-CCNC: 71 U/L (ref 0–40)
BASOPHILS ABSOLUTE: 0 K/UL (ref 0–0.2)
BASOPHILS RELATIVE PERCENT: 0.3 %
BILIRUB SERPL-MCNC: 1.9 MG/DL (ref 0–1.2)
BLOOD CULTURE, ROUTINE: NORMAL
BUN BLDV-MCNC: 17 MG/DL (ref 8–23)
CALCIUM SERPL-MCNC: 10 MG/DL (ref 8.6–10.2)
CHLORIDE BLD-SCNC: 104 MEQ/L (ref 98–107)
CO2: 23 MEQ/L (ref 22–29)
CREAT SERPL-MCNC: 0.94 MG/DL (ref 0.7–1.2)
CULTURE, BLOOD 2: NORMAL
EOSINOPHILS ABSOLUTE: 0 K/UL (ref 0–0.7)
EOSINOPHILS RELATIVE PERCENT: 0.6 %
GFR AFRICAN AMERICAN: >60
GFR NON-AFRICAN AMERICAN: >60
GLOBULIN: 3.2 G/DL (ref 2.3–3.5)
GLUCOSE BLD-MCNC: 149 MG/DL (ref 74–109)
HCT VFR BLD CALC: 26.1 % (ref 42–52)
HEMOGLOBIN: 8.9 G/DL (ref 14–18)
INR BLD: 1.6
LYMPHOCYTES ABSOLUTE: 0.5 K/UL (ref 1–4.8)
LYMPHOCYTES RELATIVE PERCENT: 20.7 %
MCH RBC QN AUTO: 31.6 PG (ref 27–31.3)
MCHC RBC AUTO-ENTMCNC: 34 % (ref 33–37)
MCV RBC AUTO: 92.9 FL (ref 80–100)
MONOCYTES ABSOLUTE: 0.6 K/UL (ref 0.2–0.8)
MONOCYTES RELATIVE PERCENT: 23.6 %
NEUTROPHILS ABSOLUTE: 1.3 K/UL (ref 1.4–6.5)
NEUTROPHILS RELATIVE PERCENT: 54.8 %
PDW BLD-RTO: 19.3 % (ref 11.5–14.5)
PLATELET # BLD: 132 K/UL (ref 130–400)
POTASSIUM SERPL-SCNC: 3.7 MEQ/L (ref 3.5–5.1)
PROTHROMBIN TIME: 15.1 SEC (ref 9–11.5)
RBC # BLD: 2.81 M/UL (ref 4.7–6.1)
SODIUM BLD-SCNC: 138 MEQ/L (ref 132–144)
TOTAL PROTEIN: 5.8 G/DL (ref 6.4–8.1)
WBC # BLD: 2.4 K/UL (ref 4.8–10.8)

## 2018-12-05 PROCEDURE — 97116 GAIT TRAINING THERAPY: CPT

## 2018-12-05 PROCEDURE — 80053 COMPREHEN METABOLIC PANEL: CPT

## 2018-12-05 PROCEDURE — 36415 COLL VENOUS BLD VENIPUNCTURE: CPT

## 2018-12-05 PROCEDURE — 2580000003 HC RX 258: Performed by: INTERNAL MEDICINE

## 2018-12-05 PROCEDURE — 1210000000 HC MED SURG R&B

## 2018-12-05 PROCEDURE — 85610 PROTHROMBIN TIME: CPT

## 2018-12-05 PROCEDURE — 97110 THERAPEUTIC EXERCISES: CPT

## 2018-12-05 PROCEDURE — 97530 THERAPEUTIC ACTIVITIES: CPT

## 2018-12-05 PROCEDURE — 71045 X-RAY EXAM CHEST 1 VIEW: CPT

## 2018-12-05 PROCEDURE — 6370000000 HC RX 637 (ALT 250 FOR IP): Performed by: INTERNAL MEDICINE

## 2018-12-05 PROCEDURE — 85025 COMPLETE CBC W/AUTO DIFF WBC: CPT

## 2018-12-05 PROCEDURE — 6360000002 HC RX W HCPCS: Performed by: INTERNAL MEDICINE

## 2018-12-05 RX ORDER — WARFARIN SODIUM 2 MG/1
2 TABLET ORAL
Status: COMPLETED | OUTPATIENT
Start: 2018-12-05 | End: 2018-12-05

## 2018-12-05 RX ADMIN — PIPERACILLIN SODIUM AND TAZOBACTAM SODIUM 3.38 G: 3; .375 INJECTION, POWDER, LYOPHILIZED, FOR SOLUTION INTRAVENOUS at 02:21

## 2018-12-05 RX ADMIN — WARFARIN SODIUM 2 MG: 2 TABLET ORAL at 18:04

## 2018-12-05 RX ADMIN — PIPERACILLIN SODIUM AND TAZOBACTAM SODIUM 3.38 G: 3; .375 INJECTION, POWDER, LYOPHILIZED, FOR SOLUTION INTRAVENOUS at 18:04

## 2018-12-05 RX ADMIN — ISOSORBIDE MONONITRATE 30 MG: 30 TABLET, EXTENDED RELEASE ORAL at 09:34

## 2018-12-05 RX ADMIN — ACETAMINOPHEN 650 MG: 325 TABLET ORAL at 05:12

## 2018-12-05 RX ADMIN — ASPIRIN 81 MG 81 MG: 81 TABLET ORAL at 09:34

## 2018-12-05 RX ADMIN — PIPERACILLIN SODIUM AND TAZOBACTAM SODIUM 3.38 G: 3; .375 INJECTION, POWDER, LYOPHILIZED, FOR SOLUTION INTRAVENOUS at 09:34

## 2018-12-05 RX ADMIN — METOPROLOL SUCCINATE 25 MG: 25 TABLET, FILM COATED, EXTENDED RELEASE ORAL at 09:34

## 2018-12-05 ASSESSMENT — PAIN SCALES - GENERAL
PAINLEVEL_OUTOF10: 0

## 2018-12-05 ASSESSMENT — PAIN SCALES - WONG BAKER: WONGBAKER_NUMERICALRESPONSE: 0

## 2018-12-05 NOTE — PROGRESS NOTES
co-morbidities; Moderate complexity: 7 deficits  Treatment Diagnosis: muscle weakness  Prognosis: Good;Fair  Patient Education: Energy conservation eduction  REQUIRES OT FOLLOW UP: Yes  Activity Tolerance  Activity Tolerance: Patient Tolerated treatment well          Plan   Plan  Times per week: 3-6x  Current Treatment Recommendations: Strengthening, Balance Training, Endurance Training, Safety Education & Training, Self-Care / ADL  G-Code     OutComes Score                                           AM-PAC Score             Goals  Short term goals  Time Frame for Short term goals: 3-6 x week x 1 week  Short term goal 1: Increase activity tolerance to 20 minutes for engagement in ADLs  Short term goal 2: Darroll Donn with toilet transfers with use of grab bars to promote toileting activity  Short term goal 3: Demo MIN A with UB dresssing using EC tech prn  Short term goal 4: Demo MIN A with LB dressing GOOD safety awareness  Short term goal 5: Demo MIN A with UB and LB bathing EC as needed  Patient Goals   Patient goals :  \"Get stronger\"       Therapy Time   Individual Concurrent Group Co-treatment   Time In  11:00         Time Out  11:45         Minutes  [address] [place] [address] Number: [id number]

## 2018-12-05 NOTE — PROGRESS NOTES
x 1  Short term goal 4: Pt able to be up in recliner for all of meals to build up pts endurance  Short term goal 5: Pt able to tolerate 10 reps of ther ex to build up strength  Long term goals  Time Frame for Long term goals : Same as STGs  Patient Goals   Patient goals : Be able to move better    Plan    Plan  Times per week: 5-7 days a week  Times per day: Daily (QD to BID)  Plan weeks: Recommend ANA  Current Treatment Recommendations: Strengthening, Balance Training, Functional Mobility Training, Transfer Training, Endurance Training, Gait Training, Stair training, Neuromuscular Re-education, Pain Management, Home Exercise Program, Patient/Caregiver Education & Training  Safety Devices  Type of devices: (P) All fall risk precautions in place, Bed alarm in place, Call light within reach     Therapy Time   Individual Concurrent Group Co-treatment   Time In  215         Time Out  310         Minutes  Graham Sharp 79, PTA  License and [address] Number: [phone]

## 2018-12-06 ENCOUNTER — HOSPITAL ENCOUNTER (INPATIENT)
Age: 78
LOS: 8 days | Discharge: HOSPICE | DRG: 194 | End: 2018-12-14
Attending: INTERNAL MEDICINE | Admitting: INTERNAL MEDICINE
Payer: MEDICARE

## 2018-12-06 VITALS
DIASTOLIC BLOOD PRESSURE: 55 MMHG | RESPIRATION RATE: 18 BRPM | HEART RATE: 73 BPM | TEMPERATURE: 98.6 F | BODY MASS INDEX: 20.72 KG/M2 | HEIGHT: 66 IN | WEIGHT: 128.9 LBS | OXYGEN SATURATION: 98 % | SYSTOLIC BLOOD PRESSURE: 127 MMHG

## 2018-12-06 LAB
ALBUMIN SERPL-MCNC: 2.6 G/DL (ref 3.9–4.9)
ALP BLD-CCNC: 761 U/L (ref 35–104)
ALT SERPL-CCNC: 64 U/L (ref 0–41)
ANION GAP SERPL CALCULATED.3IONS-SCNC: 10 MEQ/L (ref 7–13)
ANISOCYTOSIS: PRESENT
AST SERPL-CCNC: 70 U/L (ref 0–40)
BASOPHILS ABSOLUTE: 0 K/UL (ref 0–0.2)
BASOPHILS RELATIVE PERCENT: 0 %
BILIRUB SERPL-MCNC: 2.3 MG/DL (ref 0–1.2)
BUN BLDV-MCNC: 17 MG/DL (ref 8–23)
CALCIUM SERPL-MCNC: 9.8 MG/DL (ref 8.6–10.2)
CHLORIDE BLD-SCNC: 102 MEQ/L (ref 98–107)
CO2: 24 MEQ/L (ref 22–29)
CREAT SERPL-MCNC: 0.94 MG/DL (ref 0.7–1.2)
EOSINOPHILS ABSOLUTE: 0 K/UL (ref 0–0.7)
EOSINOPHILS RELATIVE PERCENT: 0.4 %
GFR AFRICAN AMERICAN: >60
GFR NON-AFRICAN AMERICAN: >60
GLOBULIN: 3.4 G/DL (ref 2.3–3.5)
GLUCOSE BLD-MCNC: 162 MG/DL (ref 74–109)
HCT VFR BLD CALC: 26.6 % (ref 42–52)
HEMOGLOBIN: 8.9 G/DL (ref 14–18)
INR BLD: 1.7
LYMPHOCYTES ABSOLUTE: 0.7 K/UL (ref 1–4.8)
LYMPHOCYTES RELATIVE PERCENT: 29 %
MCH RBC QN AUTO: 31.2 PG (ref 27–31.3)
MCHC RBC AUTO-ENTMCNC: 33.5 % (ref 33–37)
MCV RBC AUTO: 93 FL (ref 80–100)
MONOCYTES ABSOLUTE: 0.5 K/UL (ref 0.2–0.8)
MONOCYTES RELATIVE PERCENT: 22.4 %
NEUTROPHILS ABSOLUTE: 1.1 K/UL (ref 1.4–6.5)
NEUTROPHILS RELATIVE PERCENT: 48.2 %
PDW BLD-RTO: 20 % (ref 11.5–14.5)
PLATELET # BLD: 135 K/UL (ref 130–400)
POTASSIUM SERPL-SCNC: 4 MEQ/L (ref 3.5–5.1)
PROTHROMBIN TIME: 16.8 SEC (ref 9–11.5)
RBC # BLD: 2.86 M/UL (ref 4.7–6.1)
SODIUM BLD-SCNC: 136 MEQ/L (ref 132–144)
TOTAL PROTEIN: 6 G/DL (ref 6.4–8.1)
WBC # BLD: 2.3 K/UL (ref 4.8–10.8)

## 2018-12-06 PROCEDURE — 80053 COMPREHEN METABOLIC PANEL: CPT

## 2018-12-06 PROCEDURE — 6370000000 HC RX 637 (ALT 250 FOR IP): Performed by: INTERNAL MEDICINE

## 2018-12-06 PROCEDURE — 97530 THERAPEUTIC ACTIVITIES: CPT

## 2018-12-06 PROCEDURE — 85025 COMPLETE CBC W/AUTO DIFF WBC: CPT

## 2018-12-06 PROCEDURE — 6360000002 HC RX W HCPCS: Performed by: INTERNAL MEDICINE

## 2018-12-06 PROCEDURE — 36415 COLL VENOUS BLD VENIPUNCTURE: CPT

## 2018-12-06 PROCEDURE — 2580000003 HC RX 258: Performed by: INTERNAL MEDICINE

## 2018-12-06 PROCEDURE — 97535 SELF CARE MNGMENT TRAINING: CPT

## 2018-12-06 PROCEDURE — 1200000000 HC SEMI PRIVATE

## 2018-12-06 PROCEDURE — 97116 GAIT TRAINING THERAPY: CPT

## 2018-12-06 PROCEDURE — 85610 PROTHROMBIN TIME: CPT

## 2018-12-06 RX ORDER — ACETAMINOPHEN 325 MG/1
650 TABLET ORAL EVERY 6 HOURS PRN
Status: DISCONTINUED | OUTPATIENT
Start: 2018-12-06 | End: 2018-12-14 | Stop reason: HOSPADM

## 2018-12-06 RX ORDER — ONDANSETRON 2 MG/ML
2 INJECTION INTRAMUSCULAR; INTRAVENOUS EVERY 4 HOURS PRN
Status: CANCELLED | OUTPATIENT
Start: 2018-12-06

## 2018-12-06 RX ORDER — ISOSORBIDE MONONITRATE 30 MG/1
30 TABLET, EXTENDED RELEASE ORAL DAILY
Status: CANCELLED | OUTPATIENT
Start: 2018-12-07

## 2018-12-06 RX ORDER — ISOSORBIDE MONONITRATE 30 MG/1
30 TABLET, EXTENDED RELEASE ORAL DAILY
Status: DISCONTINUED | OUTPATIENT
Start: 2018-12-07 | End: 2018-12-14 | Stop reason: HOSPADM

## 2018-12-06 RX ORDER — SODIUM CHLORIDE 0.9 % (FLUSH) 0.9 %
10 SYRINGE (ML) INJECTION PRN
Status: CANCELLED | OUTPATIENT
Start: 2018-12-06

## 2018-12-06 RX ORDER — ASPIRIN 81 MG/1
81 TABLET, CHEWABLE ORAL DAILY
Status: CANCELLED | OUTPATIENT
Start: 2018-12-07

## 2018-12-06 RX ORDER — ONDANSETRON 2 MG/ML
2 INJECTION INTRAMUSCULAR; INTRAVENOUS EVERY 4 HOURS PRN
Status: DISCONTINUED | OUTPATIENT
Start: 2018-12-06 | End: 2018-12-06 | Stop reason: HOSPADM

## 2018-12-06 RX ORDER — ONDANSETRON 2 MG/ML
2 INJECTION INTRAMUSCULAR; INTRAVENOUS EVERY 4 HOURS PRN
Status: DISCONTINUED | OUTPATIENT
Start: 2018-12-06 | End: 2018-12-14 | Stop reason: HOSPADM

## 2018-12-06 RX ORDER — LEVOFLOXACIN 500 MG/1
500 TABLET, FILM COATED ORAL DAILY
Status: COMPLETED | OUTPATIENT
Start: 2018-12-07 | End: 2018-12-11

## 2018-12-06 RX ORDER — SODIUM CHLORIDE 0.9 % (FLUSH) 0.9 %
10 SYRINGE (ML) INJECTION PRN
Status: DISCONTINUED | OUTPATIENT
Start: 2018-12-06 | End: 2018-12-14 | Stop reason: HOSPADM

## 2018-12-06 RX ORDER — LEVOFLOXACIN 500 MG/1
500 TABLET, FILM COATED ORAL DAILY
Status: DISCONTINUED | OUTPATIENT
Start: 2018-12-06 | End: 2018-12-06 | Stop reason: HOSPADM

## 2018-12-06 RX ORDER — METOPROLOL SUCCINATE 25 MG/1
25 TABLET, EXTENDED RELEASE ORAL DAILY
Status: CANCELLED | OUTPATIENT
Start: 2018-12-07

## 2018-12-06 RX ORDER — ACETAMINOPHEN 325 MG/1
650 TABLET ORAL EVERY 6 HOURS PRN
Status: CANCELLED | OUTPATIENT
Start: 2018-12-06

## 2018-12-06 RX ORDER — METOPROLOL SUCCINATE 25 MG/1
25 TABLET, EXTENDED RELEASE ORAL DAILY
Status: DISCONTINUED | OUTPATIENT
Start: 2018-12-07 | End: 2018-12-14 | Stop reason: HOSPADM

## 2018-12-06 RX ORDER — LEVOFLOXACIN 500 MG/1
500 TABLET, FILM COATED ORAL DAILY
Status: CANCELLED | OUTPATIENT
Start: 2018-12-06 | End: 2018-12-11

## 2018-12-06 RX ORDER — ASPIRIN 81 MG/1
81 TABLET, CHEWABLE ORAL DAILY
Status: DISCONTINUED | OUTPATIENT
Start: 2018-12-07 | End: 2018-12-14 | Stop reason: HOSPADM

## 2018-12-06 RX ADMIN — ASPIRIN 81 MG 81 MG: 81 TABLET ORAL at 10:50

## 2018-12-06 RX ADMIN — METOPROLOL SUCCINATE 25 MG: 25 TABLET, FILM COATED, EXTENDED RELEASE ORAL at 10:50

## 2018-12-06 RX ADMIN — WARFARIN SODIUM 2.5 MG: 2 TABLET ORAL at 17:34

## 2018-12-06 RX ADMIN — ONDANSETRON 2 MG: 2 INJECTION INTRAMUSCULAR; INTRAVENOUS at 05:06

## 2018-12-06 RX ADMIN — PIPERACILLIN SODIUM AND TAZOBACTAM SODIUM 3.38 G: 3; .375 INJECTION, POWDER, LYOPHILIZED, FOR SOLUTION INTRAVENOUS at 03:23

## 2018-12-06 RX ADMIN — ISOSORBIDE MONONITRATE 30 MG: 30 TABLET, EXTENDED RELEASE ORAL at 10:50

## 2018-12-06 RX ADMIN — LEVOFLOXACIN 500 MG: 500 TABLET, FILM COATED ORAL at 10:50

## 2018-12-06 ASSESSMENT — PAIN SCALES - GENERAL
PAINLEVEL_OUTOF10: 0
PAINLEVEL_OUTOF10: 2
PAINLEVEL_OUTOF10: 0

## 2018-12-06 ASSESSMENT — PAIN DESCRIPTION - ONSET: ONSET: ON-GOING

## 2018-12-06 ASSESSMENT — PAIN SCALES - WONG BAKER
WONGBAKER_NUMERICALRESPONSE: 6
WONGBAKER_NUMERICALRESPONSE: 0
WONGBAKER_NUMERICALRESPONSE: 0

## 2018-12-06 ASSESSMENT — PAIN DESCRIPTION - PAIN TYPE
TYPE: ACUTE PAIN
TYPE: CHRONIC PAIN

## 2018-12-06 ASSESSMENT — PAIN DESCRIPTION - PROGRESSION: CLINICAL_PROGRESSION: GRADUALLY WORSENING

## 2018-12-06 ASSESSMENT — PAIN DESCRIPTION - ORIENTATION: ORIENTATION: RIGHT;LEFT

## 2018-12-06 ASSESSMENT — PAIN DESCRIPTION - LOCATION
LOCATION: ABDOMEN
LOCATION: ARM;LEG

## 2018-12-06 ASSESSMENT — PAIN DESCRIPTION - DESCRIPTORS: DESCRIPTORS: ACHING

## 2018-12-06 ASSESSMENT — PAIN DESCRIPTION - FREQUENCY: FREQUENCY: INTERMITTENT

## 2018-12-06 NOTE — DISCHARGE SUMMARY
INTRA-AXIAL OR EXTRA-AXIAL FINDINGS. IF SIGNS OR SYMPTOMS PERSIST THEN CONSIDER REPEAT CT SCAN IN 12 TO 24 HOURS OR MRI TO FURTHER EVALUATE IF THERE ARE NO CONTRAINDICATIONS All CT scans at this facility use dose modulation, iterative reconstruction, and/or weight based dosing when appropriate to reduce radiation dose to as low as reasonably achievable. Ct Chest W Contrast    Result Date: 11/30/2018  EXAMINATION:  CT ANGIOGRAPHY CHEST WITH 3-D MAXIMUM INTENSITY PROJECTION RENDERING. CLINICAL HISTORY:   CHEST PAIN, CHRONIC, HIGH PROBABILITY OF CAD; history of lymphoma COMPARISONS:  NONE AVAILABLE TECHNIQUE: Spiral scans with IV bolus administration of 100 ml of Isovue-370. Images were obtained with bolus tracking in order to opacify the pulmonary arteries. Multiplanar 2D and thick slab MIP 3-D  reconstructions were performed. All CT scans at this  facility use dose modulation, iterative reconstruction, and/or weight based dosing when appropriate to reduce radiation dose to as low as reasonably achievable. FINDINGS:  There is bolus tracking error, resulting in dense opacification of the right atrium, right ventricle, right ventricular outflow track, pulmonary trunk, and proximal right and left main pulmonary arteries. The distal main pulmonary arteries, lobar branches, segmental branches, and subsegmental branches are poorly opacified, markedly limiting the sensitivity of this examination. No definite central or proximal pulmonary thromboemboli are identified. There are bibasilar subpleural subsegmental  consolidations with air bronchograms, greater on the left. There is a small left pleural effusion. There are mild emphysematous changes. Cardiac size and pulmonary vascularity are within normal limits. There is no evidence of right ventricular strain. There are coronary artery calcifications. There is aneurysmal dilatation of the ascending aorta which has a maximal cross-sectional diameter 4.1 cm.  Patient is status post median sternotomy. There is no thoracic adenopathy. The esophagus is unremarkable. There is a right middle lobe solid noncalcified nodule with average diameter of 8 mm (series 3, #57). There is a right apical solid noncalcified nodule with average diameter of 4 mm (series 3, #23). There are a few mediastinal, hilar, and pulmonary parenchymal calcifications from old granulomatous disease. There is spondylosis and kyphosis. No acute or suspicious bone lesions are identified. Scans of the subdiaphragmatic regions moderate adenopathy with enlarged lymph nodes in the gastrohepatic ligament, humble hepatis, peripancreatic region, and retroperitoneal para-aortic regions, consistent with history of lymphoma. The upper tip of the inferior vena cava filter is identified. 3-D maximum intensity projection reconstructions confirm the above findings. VERY LIMITED EXAMINATION FOR EVALUATION OF PULMONARY EMBOLISM. NO DEFINITE CENTRAL OR PROXIMAL PULMONARY EMBOLISM IDENTIFIED. IF THERE REMAINS AN INTERMEDIATE OR HIGH CLINICAL SUSPICION OF PULMONARY EMBOLISM, REPEAT CHEST CTA OR RADIONUCLIDE  LUNG SCAN IS ADVISED. BIBASILAR SUBPLEURAL SUBSEGMENTAL CONSOLIDATIONS, LEFT GREATER THAN RIGHT, CONSISTENT WITH PNEUMONIA. SMALL LEFT PARAPNEUMONIC EFFUSION. RIGHT LUNG APICAL 4 MM NODULE AND RIGHT LUNG MIDDLE LOBE 8 MM NODULE--THIS PATIENT LIKELY HAS PRIOR CHEST IMAGING STUDIES PERFORMED AT OTHER INSTITUTIONS--RETRIEVAL OF PRIOR STUDIES OR REPORTS FOR COMPARISON WOULD BE HELPFUL. [address] GUIDELINES ARE PROVIDED BELOW FOR REFERENCE, BUT ARE NOT APPLICABLE FOR PATIENTS WITH KNOWN PRIMARY CANCER. ABDOMINAL ADENOPATHY, CONSISTENT WITH HISTORY OF LYMPHOMA. ____________________________________________________________ Guidelines for Management of Incidental Pulmonary Nodules Detected on CT Images: From the Fleischner Society 2017 Radiology:2017 Jul;284(1):228-243. doi: 10.1148/radiol. [id number]. Epub 2017 Feb 23.  A: Solid Nodules*      Multiple                         Low risk**                <6 mm     No routine follow-up               6-8 mm     CT at 3-6 months, then consider CT at 18-24 months                >8 mm     CT at 3-6 months, then  consider CT at 18-24 months Use most suspicious nodule as guide to management. Follow-up intervals may vary according to size and risk (recommendation 2A). High risk**                <6 mm     Optional CT at 12 months               6-8 mm     CT at 3-6 months, then at 18-24 months                >8 mm     CT at 3-6 months, then at 18-24 months Use most suspicious nodule as guide to management. Follow-up intervals may vary according to size and risk (recommendation 2A). Note. --These recommendations do not apply to lung cancer screening, patients with immunosuppression, or patients with known primary cancer. * Dimensions are average of long and short axes, rounded to the nearest millimeter. ** Consider all relevant risk factors (see Risk Factors). ____________________________________________________________     Doyne Rom Chest Portable    Result Date: 11/30/2018  EXAMINATION: XR CHEST PORTABLE CLINICAL HISTORY: Generalized weakness, history of lymphoma COMPARISONS: None available. FINDINGS: Patient is slightly rotated to the right. Cardiac size is borderline. Pulmonary vascularity is normal. There are scattered calcifications from old granulomatous disease. There is left basilar retrocardiac opacity consistent with infiltrate or atelectasis. There is blunting of the left costophrenic angle suggesting small effusion. There is no evidence of adenopathy. There are atherosclerotic changes of the thoracic aorta. There are sternotomy wires and a mediastinal clip. There is spondylosis. LEFT BASILAR ATELECTASIS OR SMALL INFILTRATE. SMALL LEFT PLEURAL EFFUSION.     Us Abdomen Limited    Result Date: 12/1/2018  US ABDOMEN LIMITED CLINICAL HISTORY: Elevated LFT COMPARISONS: NONE FINDINGS: TECHNIQUE: TRANSABDOMINAL ULTRASOUND OF THE RIGHT UPPER QUADRANT WAS PERFORMED. The liver shows no focal parenchymal abnormalities. No intrahepatic biliary dilatation. The gallbladder shows trace pericholecystic fluid. The gallbladder wall is  thickened. It measures 6 millimeters. No sludge or stones. Common bile duct measures 5 millimeters. Pancreas is subvisualized. THERE IS TRACE PERICHOLECYSTIC FLUID. THE GALLBLADDER WALL IS THICKENED. THICKENING OF THE GALLBLADDER WALL IS A NONSPECIFIC FINDING CAN BE SEEN IN MULTIPLE ETIOLOGIES. CORRELATE CLINICALLY. CORRELATE CLINICALLY. COULD CONSIDER HIDA SCAN TO FURTHER EVALUATE UNREMARKABLE SONOGRAPHIC EXAMINATION OF LIVER    Us Dup Lower Extremities Bilateral Venous    Result Date: 11/30/2018  EXAMINATION: VENOUS DUPLEX DOPPLER STUDY OF BILATERAL LOWER EXTREMITIES: CLINICAL INFORMATION: History of deep venous thrombosis. Suboptimal CTA study for pulmonary embolism. COMPARISON:  REPORT OF LOWER EXTREMITY VENOUS DUPLEX STUDY FROM THE [facility] ON 6/21/2018 (NO IMAGES AVAILABLE) TECHNIQUE: [place] Route scale, duplex doppler, with compression and augmentation. Sonographic imaging was performed by a registered sonographer and the images were submitted for interpretation. FINDINGS:   There is linear echogenic chronic thrombus and incomplete compressibility of the proximal right femoral vein and of the proximal to mid left femoral vein. Otherwise, deep venous flow and compressibility is normal from the groins to the knees. There is segmental visualization of patent calf veins. CHRONIC NONOCCLUSIVE THROMBOSIS OF THE RIGHT PROXIMAL FEMORAL VEIN AND THE LEFT PROXIMAL TO MID FEMORAL VEIN, UNCHANGED FROM 6/21/2018. NO EVIDENCE OF ACUTE DEEP VENOUS THROMBOSIS. Results for orders placed or performed during the hospital encounter of 11/30/18   Culture Blood #1   Result Value Ref Range    Blood Culture, Routine No growth after 5 days of incubation.     Culture Blood #2   Result Value Ref Range    Culture, Blood 2 No growth after 5 days of incubation.     CBC   Result Value Ref Range    WBC 2.4 (L) 4.8 - 10.8 K/uL    RBC 3.00 (L) 4.70 - 6.10 M/uL    Hemoglobin 9.7 (L) 14.0 - 18.0 g/dL    Hematocrit 28.2 (L) 42.0 - 52.0 %    MCV 93.7 80.0 - 100.0 fL    MCH 32.3 (H) 27.0 - 31.3 pg    MCHC 34.5 33.0 - 37.0 %    RDW 19.5 (H) 11.5 - 14.5 %    Platelets 345 594 - 666 K/uL   Lactic Acid, Plasma   Result Value Ref Range    Lactic Acid 2.0 0.5 - 2.2 mmol/L   Troponin   Result Value Ref Range    Troponin 0.017 (HH) 0.000 - 0.010 ng/mL   Comprehensive Metabolic Panel   Result Value Ref Range    Sodium 134 132 - 144 mEq/L    Potassium 4.3 3.5 - 5.1 mEq/L    Chloride 96 (L) 98 - 107 mEq/L    CO2 27 22 - 29 mEq/L    Anion Gap 11 7 - 13 mEq/L    Glucose 103 74 - 109 mg/dL    BUN 25 (H) 8 - 23 mg/dL    CREATININE 1.31 (H) 0.70 - 1.20 mg/dL    GFR Non-African American 52.6 (L) >60    GFR  >60.0 >60    Calcium 10.8 (H) 8.6 - 10.2 mg/dL    Total Protein 6.6 6.4 - 8.1 g/dL    Alb 2.9 (L) 3.9 - 4.9 g/dL    Total Bilirubin 1.5 (H) 0.0 - 1.2 mg/dL    Alkaline Phosphatase 334 (H) 35 - 104 U/L    ALT 70 (H) 0 - 41 U/L     (H) 0 - 40 U/L    Globulin 3.7 (H) 2.3 - 3.5 g/dL   D-Dimer, Quantitative   Result Value Ref Range    D-Dimer, Quant 2.19 (HH) 0.00 - 0.50 mg/L FEU   Urine Reflex to Culture   Result Value Ref Range    Color, UA Yellow Straw/Yellow    Clarity, UA Clear Clear    Glucose, Ur Negative Negative mg/dL    Bilirubin Urine Negative Negative    Ketones, Urine Negative Negative mg/dL    Specific Gravity, UA 1.020 1.005 - 1.030    Blood, Urine Negative Negative    pH, UA 6.0 5.0 - 9.0    Protein, UA Negative Negative mg/dL    Urobilinogen, Urine 1.0 <2.0 E.U./dL    Nitrite, Urine Negative Negative    Leukocyte Esterase, Urine Negative Negative    Urine Reflex to Culture Not Indicated    Troponin   Result Value Ref Range    Troponin 0.023 (HH) 0.000 - 0.010 ng/mL Troponin   Result Value Ref Range    Troponin 0.025 (HH) 0.000 - 0.010 ng/mL   Protime-INR   Result Value Ref Range    Protime 25.4 (H) 9.6 - 12.3 sec    INR 2.5    Comprehensive Metabolic Panel   Result Value Ref Range    Sodium 135 132 - 144 mEq/L    Potassium 4.4 3.5 - 5.1 mEq/L    Chloride 103 98 - 107 mEq/L    CO2 23 22 - 29 mEq/L    Anion Gap 9 7 - 13 mEq/L    Glucose 123 (H) 74 - 109 mg/dL    BUN 20 8 - 23 mg/dL    CREATININE 1.00 0.70 - 1.20 mg/dL    GFR Non-African American >60.0 >60    GFR  >60.0 >60    Calcium 9.5 8.6 - 10.2 mg/dL    Total Protein 6.0 (L) 6.4 - 8.1 g/dL    Alb 2.4 (L) 3.9 - 4.9 g/dL    Total Bilirubin 1.2 0.0 - 1.2 mg/dL    Alkaline Phosphatase 377 (H) 35 - 104 U/L    ALT 68 (H) 0 - 41 U/L    AST 95 (H) 0 - 40 U/L    Globulin 3.6 (H) 2.3 - 3.5 g/dL   CBC Auto Differential   Result Value Ref Range    WBC 2.0 (L) 4.8 - 10.8 K/uL    RBC 2.86 (L) 4.70 - 6.10 M/uL    Hemoglobin 9.2 (L) 14.0 - 18.0 g/dL    Hematocrit 26.7 (L) 42.0 - 52.0 %    MCV 93.3 80.0 - 100.0 fL    MCH 32.3 (H) 27.0 - 31.3 pg    MCHC 34.6 33.0 - 37.0 %    RDW 19.3 (H) 11.5 - 14.5 %    Platelets 006 002 - 961 K/uL    Neutrophils % 54.9 %    Lymphocytes % 30.6 %    Monocytes % 13.1 %    Eosinophils % 1.2 %    Basophils % 0.2 %    Neutrophils # 1.1 (L) 1.4 - 6.5 K/uL    Lymphocytes # 0.6 (L) 1.0 - 4.8 K/uL    Monocytes # 0.3 0.2 - 0.8 K/uL    Eosinophils # 0.0 0.0 - 0.7 K/uL    Basophils # 0.0 0.0 - 0.2 K/uL    Anisocytosis PRESENT    Protime-INR   Result Value Ref Range    Protime 36.1 (H) 9.6 - 12.3 sec    INR 3.5    Protime-INR   Result Value Ref Range    Protime 59.2 (H) 9.6 - 12.3 sec    INR 5.8 (HH)    Comprehensive Metabolic Panel   Result Value Ref Range    Sodium 134 132 - 144 mEq/L    Potassium 4.1 3.5 - 5.1 mEq/L    Chloride 103 98 - 107 mEq/L    CO2 18 (L) 22 - 29 mEq/L    Anion Gap 13 7 - 13 mEq/L    Glucose 125 (H) 74 - 109 mg/dL    BUN 16 8 - 23 mg/dL    CREATININE 0.90 0.70 - 1.20 mg/dL

## 2018-12-07 LAB
INR BLD: 2.9
PROTHROMBIN TIME: 27.9 SEC (ref 9–11.5)

## 2018-12-07 PROCEDURE — 97162 PT EVAL MOD COMPLEX 30 MIN: CPT

## 2018-12-07 PROCEDURE — 97110 THERAPEUTIC EXERCISES: CPT

## 2018-12-07 PROCEDURE — 36415 COLL VENOUS BLD VENIPUNCTURE: CPT

## 2018-12-07 PROCEDURE — G8988 SELF CARE GOAL STATUS: HCPCS

## 2018-12-07 PROCEDURE — G8987 SELF CARE CURRENT STATUS: HCPCS

## 2018-12-07 PROCEDURE — G8979 MOBILITY GOAL STATUS: HCPCS

## 2018-12-07 PROCEDURE — 6370000000 HC RX 637 (ALT 250 FOR IP): Performed by: INTERNAL MEDICINE

## 2018-12-07 PROCEDURE — 97530 THERAPEUTIC ACTIVITIES: CPT

## 2018-12-07 PROCEDURE — 85610 PROTHROMBIN TIME: CPT

## 2018-12-07 PROCEDURE — G8978 MOBILITY CURRENT STATUS: HCPCS

## 2018-12-07 PROCEDURE — 1200000000 HC SEMI PRIVATE

## 2018-12-07 PROCEDURE — 97167 OT EVAL HIGH COMPLEX 60 MIN: CPT

## 2018-12-07 RX ORDER — SENNA PLUS 8.6 MG/1
1 TABLET ORAL NIGHTLY
Status: DISCONTINUED | OUTPATIENT
Start: 2018-12-07 | End: 2018-12-14 | Stop reason: HOSPADM

## 2018-12-07 RX ORDER — MEGESTROL ACETATE 20 MG/1
40 TABLET ORAL DAILY
Status: DISCONTINUED | OUTPATIENT
Start: 2018-12-07 | End: 2018-12-14 | Stop reason: HOSPADM

## 2018-12-07 RX ORDER — POLYETHYLENE GLYCOL 3350 17 G/17G
17 POWDER, FOR SOLUTION ORAL DAILY
Status: DISCONTINUED | OUTPATIENT
Start: 2018-12-07 | End: 2018-12-14 | Stop reason: HOSPADM

## 2018-12-07 RX ORDER — DOCUSATE SODIUM 100 MG/1
100 CAPSULE, LIQUID FILLED ORAL 2 TIMES DAILY
Status: DISCONTINUED | OUTPATIENT
Start: 2018-12-07 | End: 2018-12-14 | Stop reason: HOSPADM

## 2018-12-07 RX ADMIN — DOCUSATE SODIUM 100 MG: 100 CAPSULE, LIQUID FILLED ORAL at 21:35

## 2018-12-07 RX ADMIN — ACETAMINOPHEN 650 MG: 325 TABLET ORAL at 21:37

## 2018-12-07 RX ADMIN — LEVOFLOXACIN 500 MG: 500 TABLET, FILM COATED ORAL at 08:12

## 2018-12-07 RX ADMIN — ASPIRIN 81 MG 81 MG: 81 TABLET ORAL at 08:12

## 2018-12-07 RX ADMIN — ISOSORBIDE MONONITRATE 30 MG: 30 TABLET, EXTENDED RELEASE ORAL at 08:12

## 2018-12-07 RX ADMIN — SENNOSIDES 8.6 MG: 8.6 TABLET, FILM COATED ORAL at 21:35

## 2018-12-07 RX ADMIN — ACETAMINOPHEN 650 MG: 325 TABLET ORAL at 08:11

## 2018-12-07 ASSESSMENT — PAIN DESCRIPTION - FREQUENCY: FREQUENCY: INTERMITTENT

## 2018-12-07 ASSESSMENT — PAIN SCALES - WONG BAKER
WONGBAKER_NUMERICALRESPONSE: 0
WONGBAKER_NUMERICALRESPONSE: 4

## 2018-12-07 ASSESSMENT — PAIN DESCRIPTION - ONSET: ONSET: ON-GOING

## 2018-12-07 ASSESSMENT — PAIN DESCRIPTION - ORIENTATION: ORIENTATION: RIGHT;LEFT

## 2018-12-07 ASSESSMENT — PAIN SCALES - GENERAL
PAINLEVEL_OUTOF10: 3
PAINLEVEL_OUTOF10: 0

## 2018-12-07 ASSESSMENT — PAIN DESCRIPTION - LOCATION
LOCATION: GENERALIZED
LOCATION: FOOT

## 2018-12-07 ASSESSMENT — PAIN DESCRIPTION - DESCRIPTORS: DESCRIPTORS: ACHING;SORE

## 2018-12-07 ASSESSMENT — PAIN DESCRIPTION - PAIN TYPE
TYPE: CHRONIC PAIN
TYPE: CHRONIC PAIN

## 2018-12-07 NOTE — H&P
Hospital Medicine History & Physical      PCP: Sisi Rodriguez    Date of Admission: 12/6/2018    Date of Service: 12/7/18      Chief Complaint:  Weakness/fever      History Of Present Illness:  [de-identified] y.o. male who presented to [facility] with above complains, was treated for CAP. 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 Authorizing Provider   insulin glargine (LANTUS) 100 UNIT/ML injection vial Inject 10 Units into the skin nightly    Historical Provider, MD   warfarin (COUMADIN) 1 MG tablet Take 1 mg by mouth daily Monday, Wednesday, Friday, sunday    Historical Provider, MD   warfarin (COUMADIN) 1 MG tablet Take 1.5 mg by mouth daily Tuesday, Thursday, saturday    Historical Provider, MD   metoprolol succinate (TOPROL XL) 50 MG extended release tablet Take 50 mg by mouth daily Daily in pm    Historical Provider, MD   lenalidomide (REVLIMID) 10 MG chemo capsule Take 10 mg by mouth daily 21 days on 7 days off    Historical Provider, MD   gabapentin (NEURONTIN) 300 MG capsule Take 300 mg by mouth every evening. Pt has been taking one in am and one in pm, per spouse dr said she can give twice a day if needed. Collins Greene     Historical Provider, MD   fenofibrate (TRICOR) 54 MG tablet Take 54 mg by mouth daily s [facility] pharmacy: Please dispense generic fenofibrate unless prescriber denote    Historical Provider, MD   hydrochlorothiazide (HYDRODIURIL) 25 MG tablet Take 12.5 mg by mouth daily     Historical Provider, MD   isosorbide mononitrate (IMDUR) 30 MG extended release tablet Take 30 mg by mouth daily 1 tab in the am half tab PM    Historical Provider, MD   metoprolol succinate (TOPROL XL) 50 MG extended release tablet Take 50 mg by mouth daily In the am    Historical Provider, MD   glipiZIDE (GLUCOTROL) 5 MG tablet Take 5 mg by mouth 2 times daily (before meals) 5/500 twice daily    Historical Provider, MD   Cholecalciferol (VITAMIN D3) 5000 units TABS Take by mouth daily     Historical Provider, MD       Allergies:  Patient has no known allergies. Social History:      The patient currently lives at home    TOBACCO:   reports that he quit smoking about 28 years ago. His smoking use included Cigarettes. He has a 10.00 pack-year smoking history. He has never used smokeless tobacco.  ETOH:   reports that he does not drink alcohol. Family History:       Reviewed in detail and negative for DM, CAD, Cancer, CVA. Positive as follows:    Family History   Problem Relation Age of Onset    Cancer Maternal Cousin         3 second cousins each with a different cancer, colon,pancrease,and brain       REVIEW OF SYSTEMS:   Pertinent positives as noted in the HPI. All other systems reviewed and negative. PHYSICAL EXAM:    /63   Pulse 73   Temp 100 °F (37.8 °C) (Oral)   Resp 14   SpO2 100%     General appearance:  No apparent distress, appears stated age and cooperative. HEENT:  Normal cephalic, atraumatic without obvious deformity. Pupils equal, round, and reactive to light. Extra ocular muscles intact. Conjunctivae/corneas clear. Neck: Supple, with full range of motion. No jugular venous distention. Trachea midline.   Respiratory: obstruction, abd exam unremarkable- continue with atbs, follow with liver enzymes in AM  Pneumonia- chest X ray repeated- has resolution in infiltrate, continue with PO atbs   Intermittent fever despite atbs- ID to see pt for further evaluation   Weakness, knee pain- PT on case  History of DVT- full anticoag with coumadin  HTN- controlled   ANDREWS, mild dehydration- resolved with NS IV,   Lymphoma, neutropenia- chemotherapy after DC  History of DM-  HBA1c done, follow up clinically    Poor Po intake- add megestrol              Mara Claude, MD    Thank you Alfreda Paris for the opportunity to be involved in this patient's care. If you have any questions or concerns please feel free to contact me.

## 2018-12-07 NOTE — PROGRESS NOTES
Occupational Therapy   Occupational Therapy Initial Assessment  Date: 2018   Patient Name: Stuart Oh  MRN: [id number]     : 1940    Date of Service: 2018    Discharge Recommendations:  24 hour supervision or assist (recommending LTC placement, possible Hospice consult given medical diagnosis/condition and current status)         Patient Diagnosis(es): There were no encounter diagnoses. has a past medical history of CAD (coronary artery disease); Chemotherapy adverse reaction; DLBCL (diffuse large B cell lymphoma) ([facility] History of radiation therapy; Hyperlipidemia; Hypertension; Lymphoma ([facility] [facility] Neuropathy due to secondary diabetes mellitus ([facility] Psoriasis; and Type 2 diabetes mellitus without complication ([facility] has a past surgical history that includes Coronary artery bypass graft; eye surgery; Upper gastrointestinal endoscopy; Brain Biopsy; brain surgery; and lymph node biopsy. Restrictions  Restrictions/Precautions  Restrictions/Precautions: Fall Risk    Subjective   General  Chart Reviewed: Yes  Patient assessed for rehabilitation services?: Yes  Family / Caregiver Present: Yes (wife)  Diagnosis: Pneumonia, lung infiltrate, brain cancer  Subjective  Subjective: Pt and wife present for OT eval for ANA flip  Pain Assessment  Patient Currently in Pain: Yes  Pain Assessment: 0-10   King-Baker Pain Rating: Hurts little more  Pain Level:  (No number given \"quite a bit\" of pain)  Pain Type: Chronic pain  Pain Location: Foot (bilat feet)  Pain Orientation: Right, Left     Social/Functional History  Social/Functional History  Lives With: Spouse   Type of Home: House   Home Layout: Two level   Home Access: Stairs to enter with rails (wife guides pt on the stairs for safety/provides spv Simultaneous filing.  User may not have seen previous data.)  Entrance Stairs - Number of Steps: 7 steps - railing on right   Entrance Stairs - Rails: Right  Bathroom Shower/Tub: Tub/Shower unit,

## 2018-12-07 NOTE — PROGRESS NOTES
Physical Therapy    Facility/Department: [facility] UNIT  Initial Assessment    NAME: Lola Church  : 1940  MRN: [id number]    Date of Service: 2018    Discharge Recommendations:  Continue to assess pending progress (24 hr supervision or assist, LTC placement or possible hospice consult considering diagnosis, condition and current status. )        Patient Diagnosis(es): There were no encounter diagnoses. 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 has a past surgical history that includes Coronary artery bypass graft; eye surgery; Upper gastrointestinal endoscopy; Brain Biopsy; brain surgery; and lymph node biopsy. Restrictions  Restrictions/Precautions  Restrictions/Precautions: Fall Risk  Vision/Hearing  Vision: Impaired  Vision Exceptions: Wears glasses for reading     Subjective  General  Chart Reviewed: Yes  Patient assessed for rehabilitation services?: Yes  Family / Caregiver Present: Yes (wife)  Pain Screening  Patient Currently in Pain: Yes  Pain Assessment  Pain Assessment: 0-10 (Simultaneous filing. User may not have seen previous data.)  Pain Level:  (No number given \"quite a bit\" of pain)  Pain Location: Foot (bilat feet Simultaneous filing. User may not have seen previous data.)      Social/Functional History  Social/Functional History  Lives With: Spouse  Type of Home: House  Home Layout: Two level  Home Access: Stairs to enter with rails (wife guides pt on the stairs for safety)  Entrance Stairs - Number of Steps: 7 steps - railing on right  Entrance Stairs - Rails: Right  Bathroom Shower/Tub: Tub/Shower unit, Walk-in shower  Bathroom Toilet: Standard (Simultaneous filing.  User may not have seen previous data.)  Bathroom Equipment: Tub transfer bench (no grab bars in bathroom)  Bathroom Accessibility: Walker accessible  Home Equipment: Rolling walker, [employer] Help From: Family (supportive wife, )  ADL Assistance: Needs assistance  Bath: Moderate assistance  Dressing: Moderate assistance  Toileting: Needs assistance (wife assisted)  Homemaking Assistance: Needs assistance  Homemaking Responsibilities: No  Ambulation Assistance: Independent (with cane Simultaneous filing. User may not have seen previous data.)  Transfer Assistance: Needs assistance (wife assists to stand up Simultaneous filing. User may not have seen previous data.)  Active : No  Occupation: Retired  Type of occupation: Retired from [employer]  Additional Comments: Wife assists pt at home - wife in good health  Objective     AROM RLE (degrees)  RLE AROM: WFL  RLE General AROM: ankle df to neutral only  AROM LLE (degrees)  LLE AROM : WFL  LLE General AROM: ankle df to neutral only  Strength RLE  Comment: grossly 3+ to 4-/5  Strength LLE  Comment: grossly 3+ to 4-/5      Transfers  Sit to Stand: Minimal Assistance  Stand to sit: Minimal Assistance  Ambulation  Ambulation?: Yes  Ambulation 1  Surface: level tile  Device: Rolling Walker  Assistance: Minimal assistance  Quality of Gait: very slow pace, short step lengths bilat, shuffling gait, min A required for maneuvering ww during turns  Distance: 16 feet     Balance  Standing - Static: Fair;-  Standing - Dynamic: Poor      Assessment   Body structures, Functions, Activity limitations: Decreased functional mobility ; Decreased ADL status; Decreased strength;Decreased safe awareness;Decreased balance;Decreased endurance  Assessment: [de-identified] yr old male adm to [facility] with significant weakness, pain, decreased endurance and increased assist required for transfers and bedmobility and decreased amb distance.  Pt would benefit from PT to improve bed mobility , transfers, amb and overall functional

## 2018-12-08 LAB
INR BLD: 5
PROTHROMBIN TIME: 46.6 SEC (ref 9–11.5)

## 2018-12-08 PROCEDURE — 36415 COLL VENOUS BLD VENIPUNCTURE: CPT

## 2018-12-08 PROCEDURE — 1200000000 HC SEMI PRIVATE

## 2018-12-08 PROCEDURE — 97110 THERAPEUTIC EXERCISES: CPT

## 2018-12-08 PROCEDURE — 97140 MANUAL THERAPY 1/> REGIONS: CPT

## 2018-12-08 PROCEDURE — 85610 PROTHROMBIN TIME: CPT

## 2018-12-08 PROCEDURE — 6370000000 HC RX 637 (ALT 250 FOR IP): Performed by: INTERNAL MEDICINE

## 2018-12-08 RX ADMIN — ISOSORBIDE MONONITRATE 30 MG: 30 TABLET, EXTENDED RELEASE ORAL at 08:59

## 2018-12-08 RX ADMIN — POLYETHYLENE GLYCOL 3350 17 G: 17 POWDER, FOR SOLUTION ORAL at 08:58

## 2018-12-08 RX ADMIN — DOCUSATE SODIUM 100 MG: 100 CAPSULE, LIQUID FILLED ORAL at 22:08

## 2018-12-08 RX ADMIN — DOCUSATE SODIUM 100 MG: 100 CAPSULE, LIQUID FILLED ORAL at 08:58

## 2018-12-08 RX ADMIN — MEGESTROL ACETATE 40 MG: 20 TABLET ORAL at 00:37

## 2018-12-08 RX ADMIN — MEGESTROL ACETATE 40 MG: 20 TABLET ORAL at 08:58

## 2018-12-08 RX ADMIN — ACETAMINOPHEN 650 MG: 325 TABLET ORAL at 12:40

## 2018-12-08 RX ADMIN — SENNOSIDES 8.6 MG: 8.6 TABLET, FILM COATED ORAL at 22:08

## 2018-12-08 RX ADMIN — ASPIRIN 81 MG 81 MG: 81 TABLET ORAL at 08:58

## 2018-12-08 RX ADMIN — LEVOFLOXACIN 500 MG: 500 TABLET, FILM COATED ORAL at 08:58

## 2018-12-08 ASSESSMENT — PAIN SCALES - GENERAL
PAINLEVEL_OUTOF10: 0
PAINLEVEL_OUTOF10: 8
PAINLEVEL_OUTOF10: 0

## 2018-12-08 ASSESSMENT — PAIN SCALES - WONG BAKER: WONGBAKER_NUMERICALRESPONSE: 8

## 2018-12-08 NOTE — PROGRESS NOTES
bilateral supine  Ankle Pumps: 1x20 AAROM bilateral supine  Comments: AAROM with ther-ex and PROM to ankle, knee, and hip joints bilaterally. PROM RLE (degrees)  RLE General PROM: Decreased globally right LE  PROM LLE (degrees)  LLE General PROM: Decreased globally left LE           Manual therapy  PROM: PROM to bilateral LEs at ankle, knee, and hip joints. Assessment   Body structures, Functions, Activity limitations: Decreased functional mobility ; Decreased ADL status; Decreased strength;Decreased safe awareness;Decreased balance;Decreased endurance  Assessment: Pt with poor tolerance to Rx this date. Declined OOB and wife in agreement that bed exercise was acceptable this session. Pt with increased pain and decreased LE joint mobility noted throughout. Treatment Diagnosis: difficulty walking, weakness  Prognosis: Fair;Poor  Patient Education: Educated pt on importance of therapy and OOB to prevent clots and bed sores. REQUIRES PT FOLLOW UP: Yes  Activity Tolerance  Activity Tolerance: Patient limited by fatigue;Patient limited by pain; Patient limited by endurance  Activity Tolerance: Poor tolerance     G-Code     OutComes Score                                                    AM-PAC Score             Goals  Short term goals  Time Frame for Short term goals: 3-4 days  Short term goal 1: Transfers with CGA  Short term goal 2: Ambulate 25 feet with ww CGA  Short term goal 3: Pt able to ambulate with AD with CGA for 50' x 1  Short term goal 4: Pt able to be up in recliner for all of meals to build up pts endurance  Short term goal 5: Pt able to tolerate 10 reps of ther ex to build up strength  Long term goals  Time Frame for Long term goals : 7-10 days  Long term goal 1: Transfers with SBA  Long term goal 2: Amb 50 feet with ww SBA  Long term goal 3: Pt perform 10-15 reps LE ex to build up endurance  Long term goal 4: Pt ascend and descend 7 steps with HR and CGA to allow safe entrance into

## 2018-12-09 ENCOUNTER — APPOINTMENT (OUTPATIENT)
Dept: GENERAL RADIOLOGY | Age: 78
DRG: 194 | End: 2018-12-09
Attending: INTERNAL MEDICINE
Payer: MEDICARE

## 2018-12-09 LAB
INR BLD: 6.1
PROTHROMBIN TIME: 56.4 SEC (ref 9–11.5)

## 2018-12-09 PROCEDURE — 97530 THERAPEUTIC ACTIVITIES: CPT

## 2018-12-09 PROCEDURE — 6360000002 HC RX W HCPCS: Performed by: INTERNAL MEDICINE

## 2018-12-09 PROCEDURE — 36415 COLL VENOUS BLD VENIPUNCTURE: CPT

## 2018-12-09 PROCEDURE — 71045 X-RAY EXAM CHEST 1 VIEW: CPT

## 2018-12-09 PROCEDURE — 85610 PROTHROMBIN TIME: CPT

## 2018-12-09 PROCEDURE — 1200000000 HC SEMI PRIVATE

## 2018-12-09 PROCEDURE — 6370000000 HC RX 637 (ALT 250 FOR IP): Performed by: INTERNAL MEDICINE

## 2018-12-09 RX ORDER — PHYTONADIONE 10 MG/ML
3 INJECTION, EMULSION INTRAMUSCULAR; INTRAVENOUS; SUBCUTANEOUS ONCE
Status: COMPLETED | OUTPATIENT
Start: 2018-12-09 | End: 2018-12-09

## 2018-12-09 RX ADMIN — MEGESTROL ACETATE 40 MG: 20 TABLET ORAL at 08:36

## 2018-12-09 RX ADMIN — SENNOSIDES 8.6 MG: 8.6 TABLET, FILM COATED ORAL at 22:28

## 2018-12-09 RX ADMIN — PHYTONADIONE 3 MG: 10 INJECTION, EMULSION INTRAMUSCULAR; INTRAVENOUS; SUBCUTANEOUS at 06:56

## 2018-12-09 RX ADMIN — DOCUSATE SODIUM 100 MG: 100 CAPSULE, LIQUID FILLED ORAL at 22:28

## 2018-12-09 RX ADMIN — ACETAMINOPHEN 650 MG: 325 TABLET ORAL at 08:36

## 2018-12-09 RX ADMIN — ACETAMINOPHEN 650 MG: 325 TABLET ORAL at 22:28

## 2018-12-09 RX ADMIN — LEVOFLOXACIN 500 MG: 500 TABLET, FILM COATED ORAL at 08:36

## 2018-12-09 RX ADMIN — ISOSORBIDE MONONITRATE 30 MG: 30 TABLET, EXTENDED RELEASE ORAL at 08:36

## 2018-12-09 RX ADMIN — ASPIRIN 81 MG 81 MG: 81 TABLET ORAL at 08:36

## 2018-12-09 RX ADMIN — DOCUSATE SODIUM 100 MG: 100 CAPSULE, LIQUID FILLED ORAL at 08:36

## 2018-12-09 ASSESSMENT — PAIN SCALES - GENERAL
PAINLEVEL_OUTOF10: 5
PAINLEVEL_OUTOF10: 3
PAINLEVEL_OUTOF10: 2
PAINLEVEL_OUTOF10: 8

## 2018-12-09 ASSESSMENT — PAIN DESCRIPTION - ONSET
ONSET: ON-GOING
ONSET: ON-GOING

## 2018-12-09 ASSESSMENT — PAIN DESCRIPTION - ORIENTATION
ORIENTATION: RIGHT;LEFT
ORIENTATION: RIGHT;LEFT

## 2018-12-09 ASSESSMENT — PAIN DESCRIPTION - PAIN TYPE
TYPE: CHRONIC PAIN
TYPE: CHRONIC PAIN

## 2018-12-09 ASSESSMENT — PAIN DESCRIPTION - LOCATION
LOCATION: GENERALIZED;LEG
LOCATION: GENERALIZED;LEG

## 2018-12-09 NOTE — PROGRESS NOTES
Clinical Pharmacy Note    Warfarin consult follow-up    Recent Labs      12/09/18   0547   INR  6.1*     No results for input(s): HGB, HCT, PLT in the last 72 hours. Significant drug:drug interactions: Levofloxacin, aspirin, Megace  New warfarin drug-drug interactions: Vitamin K  3 mg s/c      Notes:  Date INR Warfarin Dose   12/8/18  5.0 hold   12/9/18 6.1   HOLD                                             Today's INR at 6.1. Patient received vitamin k 3mg subcut this morning. Elevated INR probably due to Levaquin. No warfarin today. Daily PT/INR until stable within therapeutic range.    Payal Farrar RP

## 2018-12-09 NOTE — PROGRESS NOTES
Hospitalist Progress Note      PCP: Fernando Pham    Date of Admission: 12/6/2018    Chief Complaint: cough, weakness    Subjective: pt awake, looks confused    Medications:  Reviewed    Infusion Medications   Scheduled Medications    docusate sodium  100 mg Oral BID    polyethylene glycol  17 g Oral Daily    senna  1 tablet Oral Nightly    megestrol  40 mg Oral Daily    aspirin  81 mg Oral Daily    isosorbide mononitrate  30 mg Oral Daily    levofloxacin  500 mg Oral Daily    metoprolol succinate  25 mg Oral Daily    [START ON 12/4/2019] warfarin (COUMADIN) daily dosing (placeholder)   Other RX Placeholder     PRN Meds: magnesium hydroxide, acetaminophen, ondansetron, sodium chloride flush      Intake/Output Summary (Last 24 hours) at 12/09/18 0942  Last data filed at 12/08/18 1220   Gross per 24 hour   Intake               60 ml   Output                0 ml   Net               60 ml       Exam:    BP (!) 105/54   Pulse 90   Temp 99.3 °F (37.4 °C) (Oral)   Resp 16   Ht 5' 5\" (1.651 m) Comment: from [facility] chart  SpO2 97%   BMI 21.45 kg/m²     General appearance: No apparent distress, appears stated age and partially cooperative. HEENT: Pupils equal, round, and reactive to light. Conjunctivae/corneas clear. Neck: Supple, with full range of motion. No jugular venous distention. Trachea midline. Respiratory:   bilaterally with Rhonchi. Cardiovascular: Regular rate and rhythm with normal S1/S2 without murmurs, rubs or gallops. Abdomen: Soft, non-tender, non-distended with normal bowel sounds. Musculoskeletal: No clubbing, cyanosis or edema bilaterally. Full range of motion without deformity. Skin: Skin color, texture, turgor normal.  No rashes or lesions. Neurologic:  Neurovascularly intact without any focal sensory/motor deficits.  Cranial nerves: II-XII intact, grossly non-focal.  Psychiatric:partially Alert and oriented,   Capillary Refill: Brisk,< 3 seconds   Peripheral Pulses: +2

## 2018-12-10 LAB
ANION GAP SERPL CALCULATED.3IONS-SCNC: 14 MEQ/L (ref 7–13)
ANISOCYTOSIS: PRESENT
BANDED NEUTROPHILS RELATIVE PERCENT: 5 %
BASOPHILS ABSOLUTE: 0 K/UL (ref 0–0.2)
BASOPHILS RELATIVE PERCENT: 0 %
BUN BLDV-MCNC: 18 MG/DL (ref 8–23)
CALCIUM SERPL-MCNC: 10 MG/DL (ref 8.6–10.2)
CHLORIDE BLD-SCNC: 100 MEQ/L (ref 98–107)
CO2: 21 MEQ/L (ref 22–29)
CREAT SERPL-MCNC: 0.77 MG/DL (ref 0.7–1.2)
EOSINOPHILS ABSOLUTE: 0 K/UL (ref 0–0.7)
EOSINOPHILS RELATIVE PERCENT: 0 %
GFR AFRICAN AMERICAN: >60
GFR NON-AFRICAN AMERICAN: >60
GLUCOSE BLD-MCNC: 121 MG/DL (ref 74–109)
HCT VFR BLD CALC: 24.9 % (ref 42–52)
HEMOGLOBIN: 8.4 G/DL (ref 14–18)
INR BLD: 2
LYMPHOCYTES ABSOLUTE: 0.4 K/UL (ref 1–4.8)
LYMPHOCYTES RELATIVE PERCENT: 25 %
MCH RBC QN AUTO: 31 PG (ref 27–31.3)
MCHC RBC AUTO-ENTMCNC: 34 % (ref 33–37)
MCV RBC AUTO: 91.3 FL (ref 80–100)
MONOCYTES ABSOLUTE: 0.2 K/UL (ref 0.2–0.8)
MONOCYTES RELATIVE PERCENT: 11 %
NEUTROPHILS ABSOLUTE: 0.9 K/UL (ref 1.4–6.5)
NEUTROPHILS RELATIVE PERCENT: 59 %
OVALOCYTES: ABNORMAL
PDW BLD-RTO: 20.3 % (ref 11.5–14.5)
PLATELET # BLD: 89 K/UL (ref 130–400)
POIKILOCYTES: ABNORMAL
POLYCHROMASIA: ABNORMAL
POTASSIUM SERPL-SCNC: 4 MEQ/L (ref 3.5–5.1)
PROTHROMBIN TIME: 19.2 SEC (ref 9–11.5)
RBC # BLD: 2.72 M/UL (ref 4.7–6.1)
ROULEAUX: ABNORMAL
SODIUM BLD-SCNC: 135 MEQ/L (ref 132–144)
WBC # BLD: 1.4 K/UL (ref 4.8–10.8)

## 2018-12-10 PROCEDURE — 36415 COLL VENOUS BLD VENIPUNCTURE: CPT

## 2018-12-10 PROCEDURE — 85610 PROTHROMBIN TIME: CPT

## 2018-12-10 PROCEDURE — 6370000000 HC RX 637 (ALT 250 FOR IP): Performed by: INTERNAL MEDICINE

## 2018-12-10 PROCEDURE — 97110 THERAPEUTIC EXERCISES: CPT

## 2018-12-10 PROCEDURE — 97530 THERAPEUTIC ACTIVITIES: CPT

## 2018-12-10 PROCEDURE — 97116 GAIT TRAINING THERAPY: CPT

## 2018-12-10 PROCEDURE — 80048 BASIC METABOLIC PNL TOTAL CA: CPT

## 2018-12-10 PROCEDURE — 1200000000 HC SEMI PRIVATE

## 2018-12-10 PROCEDURE — 85025 COMPLETE CBC W/AUTO DIFF WBC: CPT

## 2018-12-10 RX ORDER — WARFARIN SODIUM 1 MG/1
1 TABLET ORAL
Status: COMPLETED | OUTPATIENT
Start: 2018-12-10 | End: 2018-12-10

## 2018-12-10 RX ADMIN — LEVOFLOXACIN 500 MG: 500 TABLET, FILM COATED ORAL at 09:50

## 2018-12-10 RX ADMIN — DOCUSATE SODIUM 100 MG: 100 CAPSULE, LIQUID FILLED ORAL at 09:50

## 2018-12-10 RX ADMIN — WARFARIN SODIUM 1 MG: 1 TABLET ORAL at 17:06

## 2018-12-10 RX ADMIN — ASPIRIN 81 MG 81 MG: 81 TABLET ORAL at 09:50

## 2018-12-10 RX ADMIN — ISOSORBIDE MONONITRATE 30 MG: 30 TABLET, EXTENDED RELEASE ORAL at 09:50

## 2018-12-10 RX ADMIN — METOPROLOL SUCCINATE 25 MG: 25 TABLET, FILM COATED, EXTENDED RELEASE ORAL at 09:50

## 2018-12-10 RX ADMIN — MEGESTROL ACETATE 40 MG: 20 TABLET ORAL at 09:50

## 2018-12-10 ASSESSMENT — PAIN SCALES - GENERAL
PAINLEVEL_OUTOF10: 0

## 2018-12-10 ASSESSMENT — PAIN DESCRIPTION - PAIN TYPE: TYPE: CHRONIC PAIN

## 2018-12-10 ASSESSMENT — PAIN SCALES - WONG BAKER: WONGBAKER_NUMERICALRESPONSE: 6

## 2018-12-10 NOTE — PROGRESS NOTES
Concurrent Group Co-treatment   Time In  9:50         Time Out  10:30         Minutes  [address] [place], [place]

## 2018-12-10 NOTE — PROGRESS NOTES
G-Code     OutComes Score                                                    AM-PAC Score             Goals  Short term goals  Time Frame for Short term goals: (P) 3-4 days  Short term goal 1: (P) Transfers with CGA  Short term goal 2: (P) Ambulate 25 feet with ww CGA  Short term goal 3: (P) Pt able to ambulate with AD with CGA for 50' x 1  Short term goal 4: (P) Pt able to be up in recliner for all of meals to build up pts endurance  Short term goal 5: (P) Pt able to tolerate 10 reps of ther ex to build up strength  Long term goals  Time Frame for Long term goals : (P) 7-10 days  Long term goal 1: (P) Transfers with SBA  Long term goal 2: (P) Amb 50 feet with ww SBA  Long term goal 3: (P) Pt perform 10-15 reps LE ex to build up endurance  Long term goal 4: (P) Pt ascend and descend 7 steps with HR and CGA to allow safe entrance into home  Patient Goals   Patient goals : (P) Be able to move better    Plan    Plan  Times per week: (P) 5-7  Times per day: (P) Daily (1-2)  Current Treatment Recommendations: (P) Strengthening, Balance Training, Functional Mobility Training, Transfer Training, Gait Training, Stair training, Endurance Training, Patient/Caregiver Education & Training, Safety Education & Training, Home Exercise Program, Neuromuscular Re-education  Safety Devices  Type of devices: (P) Bed alarm in place, Call light within reach, Left in bed  Restraints  Initially in place: No     Therapy Time   Individual Concurrent Group Co-treatment   Time In           Time Out           Minutes  [address] [facility]  License and Documentation Cosign  Therapy License Number: [id number]

## 2018-12-11 LAB
ANISOCYTOSIS: PRESENT
BANDED NEUTROPHILS RELATIVE PERCENT: 12 %
BASOPHILS ABSOLUTE: 0 K/UL (ref 0–0.2)
BASOPHILS RELATIVE PERCENT: 0 %
EOSINOPHILS ABSOLUTE: 0 K/UL (ref 0–0.7)
EOSINOPHILS RELATIVE PERCENT: 0 %
HCT VFR BLD CALC: 23 % (ref 42–52)
HEMOGLOBIN: 8.2 G/DL (ref 14–18)
INR BLD: 1.3
LYMPHOCYTES ABSOLUTE: 0.5 K/UL (ref 1–4.8)
LYMPHOCYTES RELATIVE PERCENT: 30 %
MCH RBC QN AUTO: 33.4 PG (ref 27–31.3)
MCHC RBC AUTO-ENTMCNC: 35.7 % (ref 33–37)
MCV RBC AUTO: 93.5 FL (ref 80–100)
MONOCYTES ABSOLUTE: 0.2 K/UL (ref 0.2–0.8)
MONOCYTES RELATIVE PERCENT: 13 %
NEUTROPHILS ABSOLUTE: 0.9 K/UL (ref 1.4–6.5)
NEUTROPHILS RELATIVE PERCENT: 45 %
OVALOCYTES: ABNORMAL
PDW BLD-RTO: 20.7 % (ref 11.5–14.5)
PLATELET # BLD: 94 K/UL (ref 130–400)
POIKILOCYTES: ABNORMAL
POLYCHROMASIA: ABNORMAL
PROTHROMBIN TIME: 12.7 SEC (ref 9–11.5)
RBC # BLD: 2.46 M/UL (ref 4.7–6.1)
WBC # BLD: 1.6 K/UL (ref 4.8–10.8)

## 2018-12-11 PROCEDURE — 97116 GAIT TRAINING THERAPY: CPT

## 2018-12-11 PROCEDURE — 85025 COMPLETE CBC W/AUTO DIFF WBC: CPT

## 2018-12-11 PROCEDURE — 6370000000 HC RX 637 (ALT 250 FOR IP): Performed by: INTERNAL MEDICINE

## 2018-12-11 PROCEDURE — 99306 1ST NF CARE HIGH MDM 50: CPT | Performed by: INTERNAL MEDICINE

## 2018-12-11 PROCEDURE — 87149 DNA/RNA DIRECT PROBE: CPT

## 2018-12-11 PROCEDURE — 6360000002 HC RX W HCPCS: Performed by: INTERNAL MEDICINE

## 2018-12-11 PROCEDURE — 87077 CULTURE AEROBIC IDENTIFY: CPT

## 2018-12-11 PROCEDURE — 1200000000 HC SEMI PRIVATE

## 2018-12-11 PROCEDURE — 2580000003 HC RX 258

## 2018-12-11 PROCEDURE — 2580000003 HC RX 258: Performed by: INTERNAL MEDICINE

## 2018-12-11 PROCEDURE — 87040 BLOOD CULTURE FOR BACTERIA: CPT

## 2018-12-11 PROCEDURE — 85610 PROTHROMBIN TIME: CPT

## 2018-12-11 PROCEDURE — 97530 THERAPEUTIC ACTIVITIES: CPT

## 2018-12-11 PROCEDURE — 36415 COLL VENOUS BLD VENIPUNCTURE: CPT

## 2018-12-11 RX ORDER — SODIUM CHLORIDE 9 MG/ML
INJECTION, SOLUTION INTRAVENOUS
Status: COMPLETED
Start: 2018-12-11 | End: 2018-12-11

## 2018-12-11 RX ORDER — WARFARIN SODIUM 2 MG/1
2 TABLET ORAL
Status: COMPLETED | OUTPATIENT
Start: 2018-12-11 | End: 2018-12-11

## 2018-12-11 RX ORDER — L. ACIDOPHILUS/L.BULGARICUS 1MM CELL
4 TABLET ORAL 3 TIMES DAILY
Status: DISCONTINUED | OUTPATIENT
Start: 2018-12-11 | End: 2018-12-14 | Stop reason: HOSPADM

## 2018-12-11 RX ADMIN — SODIUM CHLORIDE: 9 INJECTION, SOLUTION INTRAVENOUS at 15:07

## 2018-12-11 RX ADMIN — MEGESTROL ACETATE 40 MG: 20 TABLET ORAL at 08:11

## 2018-12-11 RX ADMIN — LACTOBACILLUS TAB 4 TABLET: TAB at 15:12

## 2018-12-11 RX ADMIN — MEROPENEM 1 G: 1 INJECTION, POWDER, FOR SOLUTION INTRAVENOUS at 15:06

## 2018-12-11 RX ADMIN — LEVOFLOXACIN 500 MG: 500 TABLET, FILM COATED ORAL at 08:11

## 2018-12-11 RX ADMIN — WARFARIN SODIUM 2 MG: 2 TABLET ORAL at 16:51

## 2018-12-11 RX ADMIN — ISOSORBIDE MONONITRATE 30 MG: 30 TABLET, EXTENDED RELEASE ORAL at 08:11

## 2018-12-11 RX ADMIN — ASPIRIN 81 MG 81 MG: 81 TABLET ORAL at 08:11

## 2018-12-11 RX ADMIN — LACTOBACILLUS TAB 4 TABLET: TAB at 19:49

## 2018-12-11 ASSESSMENT — ENCOUNTER SYMPTOMS
VOMITING: 0
SHORTNESS OF BREATH: 0
NAUSEA: 0
DIARRHEA: 0
COUGH: 0

## 2018-12-11 ASSESSMENT — PAIN SCALES - GENERAL
PAINLEVEL_OUTOF10: 0

## 2018-12-11 ASSESSMENT — PAIN SCALES - WONG BAKER
WONGBAKER_NUMERICALRESPONSE: 6
WONGBAKER_NUMERICALRESPONSE: 6

## 2018-12-11 NOTE — PROGRESS NOTES
(59.3 kg), SpO2 96 %. Subjective:  Symptoms:  He reports malaise and weakness. No shortness of breath, cough, chest pain, headache, chest pressure, anorexia, diarrhea or anxiety. Diet:  No nausea or vomiting. Objective:  General Appearance:  Comfortable, well-appearing and in no acute distress. Vital signs: (most recent): Blood pressure (!) 118/47, pulse 94, temperature 100.2 °F (37.9 °C), temperature source Oral, resp. rate 18, height 5' 5\" (1.651 m), weight 130 lb 11.7 oz (59.3 kg), SpO2 96 %. HEENT: Normal HEENT exam.    Lungs:  Normal effort and normal respiratory rate. Breath sounds clear to auscultation. Heart: Normal rate. Regular rhythm. S1 normal and S2 normal.    Abdomen: Abdomen is soft. Bowel sounds are normal.     Extremities: Normal range of motion. Pulses: Distal pulses are intact. Neurological: Patient is alert and oriented to person, place and time. Pupils:  Pupils are equal, round, and reactive to light. Skin:  Warm and dry. Lab Results   Component Value Date    WBC 1.6 (L) 12/11/2018    HGB 8.2 (L) 12/11/2018    HCT 23.0 (L) 12/11/2018    MCV 93.5 12/11/2018    PLT 94 (L) 12/11/2018     Lab Results   Component Value Date     12/10/2018    K 4.0 12/10/2018     12/10/2018    CO2 21 12/10/2018    BUN 18 12/10/2018    CREATININE 0.77 12/10/2018    GLUCOSE 121 12/10/2018    CALCIUM 10.0 12/10/2018        Assessment & Plan  1) low grade fever and leukopenia dn neutropenia  ?  Tumor fever  Blood cx  finish abx  ID eval  2) HTN stable  3) ANDREWS resovled  4) History of DVT c/w coumadin  Coumadin at 2 mg today  5) weakness pt/ot  Chris Ford MD  12/11/2018

## 2018-12-11 NOTE — PROGRESS NOTES
Clinical Pharmacy Note    Warfarin consult follow-up    Recent Labs      12/11/18   0532   INR  1.3     Recent Labs      12/10/18   0549  12/11/18   0905   HGB  8.4*  8.2*   HCT  24.9*  23.0*   PLT  89*  94*       Significant drug:drug interactions:  Levofloxacin therapy completed, now on meropenem     Notes:  Date INR Warfarin Dose   12/8/18  5.0 hold   12/9/18 6.1   HOLD, 3mg Vitamin K given   12/10/18 2.0  1 mg   12/11/18 1.3  2 mg                                INR is now sub-therapeutic at 1.3 compared to goal range of 2-3 for history of DVT. Dr. Richard Callahan placed order for patient to have warfarin 2mg today only. Discussed therapy with Dr. Richard Callahan - in agreement with dose as ordered. Pharmacy to continue to follow thereafter. Continue with daily PT/INR until stable within therapeutic range.     Christian Chisholm PharmD   12/11/2018 1:32 PM

## 2018-12-11 NOTE — PROGRESS NOTES
safety/sequencing, cues to remain closer to RW vs pushing it out in front too far. Bed mobility  Sit to Supine: Maximum assistance  Scooting: Dependent/Total (x2 person to scoot up in bed)  Transfers  Sit to stand: Minimal assistance  Stand to sit: Minimal assistance        Assessment   Performance deficits / Impairments: Decreased functional mobility ; Decreased ADL status; Decreased ROM; Decreased strength;Decreased cognition;Decreased endurance;Decreased balance;Decreased fine motor control;Decreased safe awareness;Decreased coordination  Assessment: Pt is an [de-identified] male whom presents s/p Pneumonia, lung infiltrate, brain cancer with the above resulting perf def/impair. Treatment Diagnosis: muscle weakness  Prognosis: Fair;Poor  REQUIRES OT FOLLOW UP: Yes          Plan   Plan  Times per week: 3-6x  Times per day: Daily  Plan weeks: 1 wk  Current Treatment Recommendations: Strengthening, Balance Training, Endurance Training, Safety Education & Training, Self-Care / ADL, Functional Mobility Training          Goals  Short term goals  Time Frame for Short term goals: 3-5 days  Short term goal 1: Increase activity tolerance to 15-20 minutes for engagement in ADLs  Short term goal 2: Increase to modA for UB dressing   Short term goal 3: Demo MIN A with UB dresssing using EC tech prn  Short term goal 4: Demo MIN A with LB dressing GOOD safety awareness  Short term goal 5: Demo MIN A with UB and LB bathing EC as needed  Long term goals  Time Frame for Long term goals : 5-7 days  Long term goal 1: Increase to Bert for self-feeding  Long term goal 2: Tolerate OOB/chair time x2-4hr/day for prevention of pneumonia or bed sores  Long term goal 3: Increase to Bert for G/H tasks  Long term goal 4: Increase to 5min stand bernadette/end for decreased fall risk during ADL  Patient Goals   Patient goals :  \"Get stronger\"       Therapy Time   Individual Concurrent Group Co-treatment   Time In  11:30         Time Out  12:00         Minutes

## 2018-12-11 NOTE — PROGRESS NOTES
Physical Therapy  Facility/Department: [facility] MED SURG UNIT  Daily Treatment Note  NAME: Olga Pérez  : 1940  MRN: [id number]    Date of Service: 2018    Discharge Recommendations:  Continue to assess pending progress, Subacute/Skilled Nursing Facility        Patient Diagnosis(es): There were no encounter diagnoses. 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 has a past surgical history that includes Coronary artery bypass graft; eye surgery; Upper gastrointestinal endoscopy; Brain Biopsy; brain surgery; and lymph node biopsy. Restrictions  Restrictions/Precautions  Restrictions/Precautions: (P) Fall Risk  Required Braces or Orthoses?: (P) No  Subjective   General  Chart Reviewed: (P) Yes  Response To Previous Treatment: (P) Patient with no complaints from previous session. Family / Caregiver Present: (P) Yes  Referring Practitioner: (PNancy Edwards  Subjective  Subjective: (P) Pt. seated in crouch position in recliner at arrival. Pt. demo's and reports decrease tolerance with seated position. Pain Screening  Patient Currently in Pain: (P) Other (comment) (unable to assess. )  Pain Assessment  Pain Assessment: (P) Faces  King-Baker Pain Rating: (P) Hurts even more  Vital Signs  Level of Consciousness: (P) Alert  Patient Currently in Pain: (P) Other (comment) (unable to assess. )  Oxygen Therapy  O2 Device: (P) None (Room air)       Orientation     Cognition      Objective   Bed mobility:  Sit -> supine max assist with VC's to engage self functional participation.    Scooting: (P) Dependent/Total (2 person assist with supine position to head of bed. )  Transfers  Sit to Stand: (P) Minimal Assistance  Stand to

## 2018-12-12 LAB
ALBUMIN SERPL-MCNC: 2.3 G/DL (ref 3.9–4.9)
ALP BLD-CCNC: 822 U/L (ref 35–104)
ALT SERPL-CCNC: 48 U/L (ref 0–41)
ANION GAP SERPL CALCULATED.3IONS-SCNC: 14 MEQ/L (ref 7–13)
AST SERPL-CCNC: 86 U/L (ref 0–40)
BILIRUB SERPL-MCNC: 5.4 MG/DL (ref 0–1.2)
BUN BLDV-MCNC: 25 MG/DL (ref 8–23)
CALCIUM SERPL-MCNC: 10 MG/DL (ref 8.6–10.2)
CHLORIDE BLD-SCNC: 101 MEQ/L (ref 98–107)
CO2: 21 MEQ/L (ref 22–29)
CREAT SERPL-MCNC: 0.82 MG/DL (ref 0.7–1.2)
GFR AFRICAN AMERICAN: >60
GFR NON-AFRICAN AMERICAN: >60
GLOBULIN: 3.4 G/DL (ref 2.3–3.5)
GLUCOSE BLD-MCNC: 125 MG/DL (ref 74–109)
INR BLD: 1.3
LIPASE: 43 U/L (ref 13–60)
POTASSIUM SERPL-SCNC: 4.3 MEQ/L (ref 3.5–5.1)
PROTHROMBIN TIME: 12.6 SEC (ref 9–11.5)
SODIUM BLD-SCNC: 136 MEQ/L (ref 132–144)
TOTAL PROTEIN: 5.7 G/DL (ref 6.4–8.1)

## 2018-12-12 PROCEDURE — 97530 THERAPEUTIC ACTIVITIES: CPT

## 2018-12-12 PROCEDURE — 1200000000 HC SEMI PRIVATE

## 2018-12-12 PROCEDURE — 51702 INSERT TEMP BLADDER CATH: CPT

## 2018-12-12 PROCEDURE — 80053 COMPREHEN METABOLIC PANEL: CPT

## 2018-12-12 PROCEDURE — 85610 PROTHROMBIN TIME: CPT

## 2018-12-12 PROCEDURE — 6370000000 HC RX 637 (ALT 250 FOR IP): Performed by: INTERNAL MEDICINE

## 2018-12-12 PROCEDURE — 36415 COLL VENOUS BLD VENIPUNCTURE: CPT

## 2018-12-12 PROCEDURE — 97110 THERAPEUTIC EXERCISES: CPT

## 2018-12-12 PROCEDURE — 6360000002 HC RX W HCPCS: Performed by: INTERNAL MEDICINE

## 2018-12-12 PROCEDURE — 83690 ASSAY OF LIPASE: CPT

## 2018-12-12 PROCEDURE — 87385 HISTOPLASMA CAPSUL AG IA: CPT

## 2018-12-12 PROCEDURE — 2580000003 HC RX 258: Performed by: INTERNAL MEDICINE

## 2018-12-12 RX ADMIN — WARFARIN SODIUM 3.5 MG: 1 TABLET ORAL at 17:32

## 2018-12-12 RX ADMIN — SENNOSIDES 8.6 MG: 8.6 TABLET, FILM COATED ORAL at 20:21

## 2018-12-12 RX ADMIN — MEROPENEM 1 G: 1 INJECTION, POWDER, FOR SOLUTION INTRAVENOUS at 00:18

## 2018-12-12 RX ADMIN — ISOSORBIDE MONONITRATE 30 MG: 30 TABLET, EXTENDED RELEASE ORAL at 08:38

## 2018-12-12 RX ADMIN — LACTOBACILLUS TAB 4 TABLET: TAB at 20:21

## 2018-12-12 RX ADMIN — MEGESTROL ACETATE 40 MG: 20 TABLET ORAL at 08:38

## 2018-12-12 RX ADMIN — LACTOBACILLUS TAB 4 TABLET: TAB at 08:38

## 2018-12-12 RX ADMIN — ACETAMINOPHEN 650 MG: 325 TABLET ORAL at 22:17

## 2018-12-12 RX ADMIN — ACETAMINOPHEN 650 MG: 325 TABLET ORAL at 08:38

## 2018-12-12 RX ADMIN — MEROPENEM 1 G: 1 INJECTION, POWDER, FOR SOLUTION INTRAVENOUS at 13:07

## 2018-12-12 RX ADMIN — DOCUSATE SODIUM 100 MG: 100 CAPSULE, LIQUID FILLED ORAL at 20:21

## 2018-12-12 RX ADMIN — ASPIRIN 81 MG 81 MG: 81 TABLET ORAL at 08:38

## 2018-12-12 RX ADMIN — LACTOBACILLUS TAB 4 TABLET: TAB at 13:07

## 2018-12-12 ASSESSMENT — PAIN SCALES - GENERAL
PAINLEVEL_OUTOF10: 4
PAINLEVEL_OUTOF10: 0
PAINLEVEL_OUTOF10: 3
PAINLEVEL_OUTOF10: 0
PAINLEVEL_OUTOF10: 0

## 2018-12-12 ASSESSMENT — PAIN SCALES - PAIN ASSESSMENT IN ADVANCED DEMENTIA (PAINAD)
BODYLANGUAGE: 0
FACIALEXPRESSION: 0
CONSOLABILITY: 0
NEGVOCALIZATION: 1
NEGVOCALIZATION: 0
CONSOLABILITY: 0
CONSOLABILITY: 1
BODYLANGUAGE: 0
NEGVOCALIZATION: 0
TOTALSCORE: 0
TOTALSCORE: 4
BODYLANGUAGE: 0
BREATHING: 0
TOTALSCORE: 0
BREATHING: 0
FACIALEXPRESSION: 1
FACIALEXPRESSION: 0
BREATHING: 1

## 2018-12-12 ASSESSMENT — PAIN DESCRIPTION - DESCRIPTORS: DESCRIPTORS: ACHING;SORE

## 2018-12-12 ASSESSMENT — PAIN DESCRIPTION - ORIENTATION: ORIENTATION: OTHER (COMMENT)

## 2018-12-12 ASSESSMENT — PAIN DESCRIPTION - PROGRESSION: CLINICAL_PROGRESSION: GRADUALLY WORSENING

## 2018-12-12 ASSESSMENT — PAIN SCALES - WONG BAKER: WONGBAKER_NUMERICALRESPONSE: 6

## 2018-12-12 ASSESSMENT — PAIN DESCRIPTION - ONSET: ONSET: ON-GOING

## 2018-12-12 ASSESSMENT — PAIN DESCRIPTION - PAIN TYPE: TYPE: OTHER (COMMENT)

## 2018-12-12 ASSESSMENT — PAIN DESCRIPTION - FREQUENCY: FREQUENCY: INTERMITTENT

## 2018-12-12 ASSESSMENT — PAIN DESCRIPTION - LOCATION: LOCATION: GENERALIZED

## 2018-12-12 NOTE — PROGRESS NOTES
Physical Therapy  Facility/Department: [facility] MED SURG UNIT  Daily Treatment Note  NAME: Charmayne Canary  : 1940  MRN: [id number]    Date of Service: 2018    Discharge Recommendations:  Continue to assess pending progress, Subacute/Skilled Nursing Facility        Patient Diagnosis(es): There were no encounter diagnoses. 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 has a past surgical history that includes Coronary artery bypass graft; eye surgery; Upper gastrointestinal endoscopy; Brain Biopsy; brain surgery; and lymph node biopsy. Restrictions  Restrictions/Precautions  Restrictions/Precautions: Fall Risk  Required Braces or Orthoses?: No  Subjective   General  Chart Reviewed: Yes  Response To Previous Treatment: Patient with no complaints from previous session. Family / Caregiver Present: Yes  Referring Practitioner: Grace  Subjective  Subjective: Pt. supine in bed. Alert. Pt. agrees with therapy session. Pt. unsure if he is in pain this day. Pain Assessment  King-Wheeler Pain Rating: Hurts even more  Vital Signs  Level of Consciousness: Alert  Oxygen Therapy  O2 Device: None (Room air)       Orientation     Cognition      Objective   Bed mobility  Scooting:  (2 person assist with supine position to head of bed. )   Supine <-> B sidelying training with 10 sec hold position x 8 trials to increase functional strength with bed mobility skills. Intermittent rests required for safety and to increase functional tolerance with activity. Min assistance required with completion of supine <-> B sidelying. Exercises  Heelslides: x20 AAROM bilateral supine  Gluteal Sets: x10 AROM with decrease participation. Patient/Caregiver Education & Training, Safety Education & Training, Home Exercise Program, Neuromuscular Re-education  Safety Devices  Type of devices: Bed alarm in place, Call light within reach, Left in bed  Restraints  Initially in place: No     Therapy Time   Individual Concurrent Group Co-treatment   Time In  240         Time Out  [facility]  License and [address] Number: [id number]

## 2018-12-13 LAB
ALBUMIN SERPL-MCNC: 2.3 G/DL (ref 3.9–4.9)
ALP BLD-CCNC: 903 U/L (ref 35–104)
ALT SERPL-CCNC: 55 U/L (ref 0–41)
ANION GAP SERPL CALCULATED.3IONS-SCNC: 14 MEQ/L (ref 7–13)
ANISOCYTOSIS: PRESENT
AST SERPL-CCNC: 110 U/L (ref 0–40)
BILIRUB SERPL-MCNC: 5.6 MG/DL (ref 0–1.2)
BILIRUBIN DIRECT: 4.6 MG/DL (ref 0–0.3)
BILIRUBIN, INDIRECT: 1 MG/DL (ref 0–0.6)
BUN BLDV-MCNC: 27 MG/DL (ref 8–23)
CALCIUM SERPL-MCNC: 9.5 MG/DL (ref 8.6–10.2)
CHLORIDE BLD-SCNC: 103 MEQ/L (ref 98–107)
CO2: 22 MEQ/L (ref 22–29)
CREAT SERPL-MCNC: 0.77 MG/DL (ref 0.7–1.2)
GFR AFRICAN AMERICAN: >60
GFR NON-AFRICAN AMERICAN: >60
GLUCOSE BLD-MCNC: 126 MG/DL (ref 74–109)
HCT VFR BLD CALC: 23.9 % (ref 42–52)
HEMOGLOBIN: 8.2 G/DL (ref 14–18)
INR BLD: 2.4
MCH RBC QN AUTO: 30.5 PG (ref 27–31.3)
MCHC RBC AUTO-ENTMCNC: 34.2 % (ref 33–37)
MCV RBC AUTO: 89.1 FL (ref 80–100)
PDW BLD-RTO: 20.4 % (ref 11.5–14.5)
PLATELET # BLD: 75 K/UL (ref 130–400)
POTASSIUM SERPL-SCNC: 4.3 MEQ/L (ref 3.5–5.1)
PROTHROMBIN TIME: 23.4 SEC (ref 9–11.5)
RBC # BLD: 2.68 M/UL (ref 4.7–6.1)
SODIUM BLD-SCNC: 139 MEQ/L (ref 132–144)
TOTAL PROTEIN: 5.6 G/DL (ref 6.4–8.1)
WBC # BLD: 1.7 K/UL (ref 4.8–10.8)

## 2018-12-13 PROCEDURE — 1200000000 HC SEMI PRIVATE

## 2018-12-13 PROCEDURE — 6360000002 HC RX W HCPCS: Performed by: INTERNAL MEDICINE

## 2018-12-13 PROCEDURE — 6370000000 HC RX 637 (ALT 250 FOR IP): Performed by: INTERNAL MEDICINE

## 2018-12-13 PROCEDURE — 85025 COMPLETE CBC W/AUTO DIFF WBC: CPT

## 2018-12-13 PROCEDURE — 80076 HEPATIC FUNCTION PANEL: CPT

## 2018-12-13 PROCEDURE — 85610 PROTHROMBIN TIME: CPT

## 2018-12-13 PROCEDURE — 36415 COLL VENOUS BLD VENIPUNCTURE: CPT

## 2018-12-13 PROCEDURE — 97110 THERAPEUTIC EXERCISES: CPT

## 2018-12-13 PROCEDURE — 97530 THERAPEUTIC ACTIVITIES: CPT

## 2018-12-13 PROCEDURE — 2580000003 HC RX 258: Performed by: INTERNAL MEDICINE

## 2018-12-13 PROCEDURE — 80048 BASIC METABOLIC PNL TOTAL CA: CPT

## 2018-12-13 RX ORDER — DRONABINOL 2.5 MG/1
2.5 CAPSULE ORAL 2 TIMES DAILY
Status: DISCONTINUED | OUTPATIENT
Start: 2018-12-13 | End: 2018-12-14 | Stop reason: HOSPADM

## 2018-12-13 RX ORDER — WARFARIN SODIUM 3 MG/1
1.5 TABLET ORAL
Status: ACTIVE | OUTPATIENT
Start: 2018-12-13 | End: 2018-12-14

## 2018-12-13 RX ADMIN — MEROPENEM 1 G: 1 INJECTION, POWDER, FOR SOLUTION INTRAVENOUS at 01:02

## 2018-12-13 RX ADMIN — MEROPENEM 1 G: 1 INJECTION, POWDER, FOR SOLUTION INTRAVENOUS at 13:23

## 2018-12-13 ASSESSMENT — PAIN SCALES - PAIN ASSESSMENT IN ADVANCED DEMENTIA (PAINAD)
TOTALSCORE: 0
CONSOLABILITY: 0
NEGVOCALIZATION: 0
TOTALSCORE: 0
FACIALEXPRESSION: 0
FACIALEXPRESSION: 0
CONSOLABILITY: 0
NEGVOCALIZATION: 0
BREATHING: 0
NEGVOCALIZATION: 0
BREATHING: 0
TOTALSCORE: 0
TOTALSCORE: 0
CONSOLABILITY: 0
TOTALSCORE: 0
FACIALEXPRESSION: 0
BREATHING: 0
FACIALEXPRESSION: 0
FACIALEXPRESSION: 0
BODYLANGUAGE: 0
CONSOLABILITY: 0
CONSOLABILITY: 0
BREATHING: 0
NEGVOCALIZATION: 0
BODYLANGUAGE: 0
CONSOLABILITY: 0
BREATHING: 0
BREATHING: 0
FACIALEXPRESSION: 0
TOTALSCORE: 0
NEGVOCALIZATION: 0
BODYLANGUAGE: 0
BODYLANGUAGE: 0
NEGVOCALIZATION: 0

## 2018-12-13 ASSESSMENT — PAIN SCALES - GENERAL
PAINLEVEL_OUTOF10: 0

## 2018-12-13 NOTE — PROGRESS NOTES
Patient thanks this  for meeting with patient and desires to move forward on meeting with hospice this evening. This  spoke with patient's RN Tom Mooney. Along with Dr. Gladys Arvizu via phone. Dr. Gladys Arvizu reports being agreeable with hospice consult and plans to put in order. SS to follow with [facility].

## 2018-12-13 NOTE — PROGRESS NOTES
Occupational Therapy  Facility/Department: [facility] MED SURG UNIT  Daily Treatment Note  NAME: Ariane Rapp  : 1940  MRN: [id number]    Date of Service: 2018    Discharge Recommendations:  24 hour supervision or assist       Patient Diagnosis(es): There were no encounter diagnoses. 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 has a past surgical history that includes Coronary artery bypass graft; eye surgery; Upper gastrointestinal endoscopy; Brain Biopsy; brain surgery; and lymph node biopsy. Restrictions  Restrictions/Precautions  Restrictions/Precautions: Fall Risk  Required Braces or Orthoses?: No  Subjective   General  Chart Reviewed: Yes  Patient assessed for rehabilitation services?: Yes  Family / Caregiver Present: Yes (wife)  Diagnosis: Pneumonia, lung infiltrate, brain cancer  Subjective  Subjective: Attempted to see pt for tx this date, OT deferred for below reasons (see objective)      Objective      Pt not seen for OT this date 2* 1st attempt pt's wife feeding him/not available, 2nd attempt pt too sleepy and unable to participate. Pt also not feeling well. Assessment            Requires OT f/u: Yes       Plan   Plan  Times per week: 3-6x  Times per day: Daily  Plan weeks: 1 wk  Current Treatment Recommendations: Strengthening, Balance Training, Endurance Training, Safety Education & Training, Self-Care / ADL, Functional Mobility Training          Goals  Short term goals  Time Frame for Short term goals: 3-5 days  Short term goal 1: Increase activity tolerance to 15-20 minutes for engagement in ADLs  Short term goal 2:  Increase to modA for UB dressing

## 2018-12-14 VITALS
WEIGHT: 130.73 LBS | OXYGEN SATURATION: 94 % | SYSTOLIC BLOOD PRESSURE: 105 MMHG | HEIGHT: 65 IN | BODY MASS INDEX: 21.78 KG/M2 | TEMPERATURE: 97.9 F | HEART RATE: 95 BPM | DIASTOLIC BLOOD PRESSURE: 62 MMHG | RESPIRATION RATE: 20 BRPM

## 2018-12-14 LAB
BLOOD CULTURE, ROUTINE: ABNORMAL
INR BLD: 4.4
ORGANISM: ABNORMAL
ORGANISM: ABNORMAL
PROTHROMBIN TIME: 41.3 SEC (ref 9–11.5)

## 2018-12-14 PROCEDURE — 85610 PROTHROMBIN TIME: CPT

## 2018-12-14 PROCEDURE — 36415 COLL VENOUS BLD VENIPUNCTURE: CPT

## 2018-12-14 PROCEDURE — 2580000003 HC RX 258: Performed by: INTERNAL MEDICINE

## 2018-12-14 PROCEDURE — 6370000000 HC RX 637 (ALT 250 FOR IP): Performed by: INTERNAL MEDICINE

## 2018-12-14 PROCEDURE — 6360000002 HC RX W HCPCS: Performed by: INTERNAL MEDICINE

## 2018-12-14 RX ORDER — MEGESTROL ACETATE 20 MG/1
40 TABLET ORAL DAILY
Status: CANCELLED | OUTPATIENT
Start: 2018-12-15

## 2018-12-14 RX ORDER — SENNA PLUS 8.6 MG/1
1 TABLET ORAL NIGHTLY
Status: CANCELLED | OUTPATIENT
Start: 2018-12-14

## 2018-12-14 RX ORDER — L. ACIDOPHILUS/L.BULGARICUS 1MM CELL
4 TABLET ORAL 3 TIMES DAILY
Status: CANCELLED | OUTPATIENT
Start: 2018-12-14

## 2018-12-14 RX ORDER — ACETAMINOPHEN 325 MG/1
650 TABLET ORAL EVERY 6 HOURS PRN
Status: CANCELLED | OUTPATIENT
Start: 2018-12-14

## 2018-12-14 RX ORDER — DOCUSATE SODIUM 100 MG/1
100 CAPSULE, LIQUID FILLED ORAL 2 TIMES DAILY
Status: CANCELLED | OUTPATIENT
Start: 2018-12-14

## 2018-12-14 RX ORDER — DRONABINOL 2.5 MG/1
2.5 CAPSULE ORAL 2 TIMES DAILY
Status: CANCELLED | OUTPATIENT
Start: 2018-12-14

## 2018-12-14 RX ORDER — ASPIRIN 81 MG/1
81 TABLET, CHEWABLE ORAL DAILY
Status: CANCELLED | OUTPATIENT
Start: 2018-12-15

## 2018-12-14 RX ORDER — POLYETHYLENE GLYCOL 3350 17 G/17G
17 POWDER, FOR SOLUTION ORAL DAILY
Status: CANCELLED | OUTPATIENT
Start: 2018-12-15

## 2018-12-14 RX ORDER — ISOSORBIDE MONONITRATE 30 MG/1
30 TABLET, EXTENDED RELEASE ORAL DAILY
Status: CANCELLED | OUTPATIENT
Start: 2018-12-15

## 2018-12-14 RX ORDER — ONDANSETRON 2 MG/ML
2 INJECTION INTRAMUSCULAR; INTRAVENOUS EVERY 4 HOURS PRN
Status: CANCELLED | OUTPATIENT
Start: 2018-12-14

## 2018-12-14 RX ORDER — METOPROLOL SUCCINATE 25 MG/1
25 TABLET, EXTENDED RELEASE ORAL DAILY
Status: CANCELLED | OUTPATIENT
Start: 2018-12-15

## 2018-12-14 RX ORDER — SODIUM CHLORIDE 0.9 % (FLUSH) 0.9 %
10 SYRINGE (ML) INJECTION PRN
Status: CANCELLED | OUTPATIENT
Start: 2018-12-14

## 2018-12-14 RX ADMIN — ACETAMINOPHEN 650 MG: 325 TABLET ORAL at 00:29

## 2018-12-14 RX ADMIN — ACETAMINOPHEN 650 MG: 325 TABLET ORAL at 06:30

## 2018-12-14 RX ADMIN — MEROPENEM 1 G: 1 INJECTION, POWDER, FOR SOLUTION INTRAVENOUS at 12:40

## 2018-12-14 RX ADMIN — MEROPENEM 1 G: 1 INJECTION, POWDER, FOR SOLUTION INTRAVENOUS at 00:29

## 2018-12-14 RX ADMIN — ACETAMINOPHEN 650 MG: 325 TABLET ORAL at 13:19

## 2018-12-14 ASSESSMENT — PAIN SCALES - PAIN ASSESSMENT IN ADVANCED DEMENTIA (PAINAD)
TOTALSCORE: 0
NEGVOCALIZATION: 0
CONSOLABILITY: 0
NEGVOCALIZATION: 0
CONSOLABILITY: 0
CONSOLABILITY: 0
FACIALEXPRESSION: 0
BREATHING: 0
FACIALEXPRESSION: 0
BODYLANGUAGE: 0
BREATHING: 0
CONSOLABILITY: 0
BREATHING: 0
TOTALSCORE: 0
FACIALEXPRESSION: 0
TOTALSCORE: 0
BREATHING: 0
CONSOLABILITY: 0
TOTALSCORE: 0
BREATHING: 0
BODYLANGUAGE: 0
CONSOLABILITY: 0
BODYLANGUAGE: 0
BODYLANGUAGE: 0
FACIALEXPRESSION: 0
CONSOLABILITY: 0
TOTALSCORE: 0
BODYLANGUAGE: 0
BODYLANGUAGE: 0
NEGVOCALIZATION: 0
NEGVOCALIZATION: 0
FACIALEXPRESSION: 0
TOTALSCORE: 0
NEGVOCALIZATION: 0
FACIALEXPRESSION: 0
FACIALEXPRESSION: 0
TOTALSCORE: 0
BREATHING: 0
BODYLANGUAGE: 0
BREATHING: 0

## 2018-12-14 ASSESSMENT — PAIN SCALES - GENERAL
PAINLEVEL_OUTOF10: 3

## 2018-12-14 NOTE — DISCHARGE SUMMARY
2021    magnesium hydroxide (MILK OF MAGNESIA) 400 MG/5ML suspension 30 mL  30 mL Oral Daily PRN Agnes Overall, MD        megestrol (MEGACE) tablet 40 mg  40 mg Oral Daily Agnes Overall, MD   40 mg at 12/12/18 1323    acetaminophen (TYLENOL) tablet 650 mg  650 mg Oral Q6H PRN Agnes Overall, MD   650 mg at 12/14/18 0630    aspirin chewable tablet 81 mg  81 mg Oral Daily Agnes Overall, MD   81 mg at 12/12/18 7415    isosorbide mononitrate (IMDUR) extended release tablet 30 mg  30 mg Oral Daily Agnes Overall, MD   30 mg at 12/12/18 1487    metoprolol succinate (TOPROL XL) extended release tablet 25 mg  25 mg Oral Daily Agnes Overall, MD   25 mg at 12/10/18 0950    ondansetron (ZOFRAN) injection 2 mg  2 mg Intravenous Q4H PRN Agnes Overall, MD        sodium chloride flush 0.9 % injection 10 mL  10 mL Intravenous PRN Agnes Overall, MD       [facility] ON 12/4/2019] warfarin (COUMADIN) daily dosing (placeholder)   Other Nikolai Velez MD             Discharge Exam:   HEENT: AT/NC, PERRLA, no JVD  HEART: s1/s2 wnl w/o s3  LUNG: clear  ABD: soft, NT  EXT: no edema  SKin : no rash  Neuro:alert      Disposition: Hospice    Patient Instructions:      Activity: as tolerated  Diet: regular    Follow-up with PCP in 1 week  Overtime on dc summary was 45 min    Signed:  Deonna Lemons  12/14/2018  10:08 AM

## 2018-12-15 LAB
HISTOPLASMA ANTIGEN URINE INTERP: NOT DETECTED
HISTOPLASMA ANTIGEN URINE: NOT DETECTED NG/ML

## 2018-12-16 LAB
CMV DNA QNT PCR: <2.6 LOG CPY/ML
CMV DNA QUANTATATIVE INTERPRETATION: <2.4 LOG IU/ML
CMV DNA QUANTATATIVE INTERPRETATION: NOT DETECTED
CMV DNA QUANTITATIVE: <227 IU/ML
CMV SOURCE: NORMAL
CMVQ COPY/ML: <390 CPY/ML
CULTURE, BLOOD 2: NORMAL

## 2025-06-20 NOTE — PROGRESS NOTES
Problem: Discharge Planning  Goal: Discharge to home or other facility with appropriate resources  Outcome: Progressing     Problem: Pain  Goal: Verbalizes/displays adequate comfort level or baseline comfort level  Outcome: Progressing      Nutrition Assessment    Type and Reason for Visit: Reassess    Nutrition Recommendations: Continue current diet, Continue current ONS    Nutrition Assessment: Pt with moderate malnutrition remains at risk for furhter nutrition compromise AEB poor intake. Not drinking ONS. Pt's wife asked to try different flavor. Nutrition Risk Level: High    Nutrient Needs:  · Estimated Daily Total Kcal: 5842-1362 @ 30-33kcal/kg  · Estimated Daily Protein (g): 77-90 @ 1.2-1.4 gr/kg  · Estimated Daily Total Fluid (ml/day): 1936    Nutrition Diagnosis:   · Problem: Moderate malnutrition, In context of chronic illness  · Etiology: related to Catabolic illness, Alteration in GI function     Signs and symptoms:  as evidenced by Weight loss greater than or equal to 5% in 1 month, Mild loss of subcutaneous fat, Moderate muscle loss    Objective Information:  · Nutrition-Focused Physical Findings: No edema. Last BM 12/3. · Wound Type: None  · Current Nutrition Therapies:  · Oral Diet Orders: General   · Oral Diet intake: 26-50%  · Oral Nutrition Supplement (ONS) Orders: Diabetic Oral Supplement  · ONS intake: 0%  · Anthropometric Measures:  · Ht: 5' 6\" (167.6 cm)   · Current Body Wt: 128 lb 15.5 oz (58.5 kg)  · Admission Body Wt: 128 lb 15.5 oz (58.5 kg)  · Usual Body Wt: 136 lb (61.7 kg) ([facility] chart 9/18.  144#, 4/18 chart)  · % Weight Change:  ,  5%  · Ideal Body Wt: 142 lb (64.4 kg), % Ideal Body 91%  · BMI Classification: BMI 18.5 - 24.9 Normal Weight    Nutrition Interventions:   Continue current diet, Continue current ONS  Continued Inpatient Monitoring    Nutrition Evaluation:   · Evaluation: No progress toward goals   · Goals: po intake > 75% meals & ONS. Slow weight gain to UBW range.     · Monitoring: Meal Intake, Supplement Intake, Diet Tolerance, Weight, Pertinent Labs      Electronically signed by Blaire Hackett RD, LD on 12/5/18 at 1:46 PM